# Patient Record
Sex: FEMALE | Race: BLACK OR AFRICAN AMERICAN | Employment: FULL TIME | URBAN - METROPOLITAN AREA
[De-identification: names, ages, dates, MRNs, and addresses within clinical notes are randomized per-mention and may not be internally consistent; named-entity substitution may affect disease eponyms.]

---

## 2022-03-08 ENCOUNTER — HOSPITAL ENCOUNTER (EMERGENCY)
Age: 32
Discharge: HOME | End: 2022-03-08
Payer: COMMERCIAL

## 2022-03-08 ENCOUNTER — APPOINTMENT (OUTPATIENT)
Dept: GENERAL RADIOLOGY | Age: 32
End: 2022-03-08
Payer: COMMERCIAL

## 2022-03-08 VITALS
WEIGHT: 225 LBS | SYSTOLIC BLOOD PRESSURE: 175 MMHG | OXYGEN SATURATION: 97 % | DIASTOLIC BLOOD PRESSURE: 103 MMHG | TEMPERATURE: 98 F | HEIGHT: 67 IN | RESPIRATION RATE: 16 BRPM | BODY MASS INDEX: 35.31 KG/M2 | HEART RATE: 93 BPM

## 2022-03-08 DIAGNOSIS — R07.9 CHEST PAIN, UNSPECIFIED TYPE: Primary | ICD-10-CM

## 2022-03-08 LAB
ANION GAP SERPL CALCULATED.3IONS-SCNC: 10 MMOL/L (ref 3–16)
BASOPHILS ABSOLUTE: 0 K/UL (ref 0–0.2)
BASOPHILS RELATIVE PERCENT: 0.6 %
BUN BLDV-MCNC: 8 MG/DL (ref 7–20)
CALCIUM SERPL-MCNC: 9.6 MG/DL (ref 8.3–10.6)
CHLORIDE BLD-SCNC: 103 MMOL/L (ref 99–110)
CO2: 24 MMOL/L (ref 21–32)
CREAT SERPL-MCNC: 0.8 MG/DL (ref 0.6–1.1)
EKG ATRIAL RATE: 67 BPM
EKG DIAGNOSIS: NORMAL
EKG P AXIS: 59 DEGREES
EKG P-R INTERVAL: 138 MS
EKG Q-T INTERVAL: 386 MS
EKG QRS DURATION: 80 MS
EKG QTC CALCULATION (BAZETT): 407 MS
EKG R AXIS: 27 DEGREES
EKG T AXIS: 50 DEGREES
EKG VENTRICULAR RATE: 67 BPM
EOSINOPHILS ABSOLUTE: 0 K/UL (ref 0–0.6)
EOSINOPHILS RELATIVE PERCENT: 1 %
GFR AFRICAN AMERICAN: >60
GFR NON-AFRICAN AMERICAN: >60
GLUCOSE BLD-MCNC: 95 MG/DL (ref 70–99)
HCT VFR BLD CALC: 37.1 % (ref 36–48)
HEMOGLOBIN: 11.9 G/DL (ref 12–16)
LYMPHOCYTES ABSOLUTE: 1.4 K/UL (ref 1–5.1)
LYMPHOCYTES RELATIVE PERCENT: 31.1 %
MCH RBC QN AUTO: 26.8 PG (ref 26–34)
MCHC RBC AUTO-ENTMCNC: 32 G/DL (ref 31–36)
MCV RBC AUTO: 83.7 FL (ref 80–100)
MONOCYTES ABSOLUTE: 0.3 K/UL (ref 0–1.3)
MONOCYTES RELATIVE PERCENT: 7.6 %
NEUTROPHILS ABSOLUTE: 2.6 K/UL (ref 1.7–7.7)
NEUTROPHILS RELATIVE PERCENT: 59.7 %
PDW BLD-RTO: 13.3 % (ref 12.4–15.4)
PLATELET # BLD: 312 K/UL (ref 135–450)
PMV BLD AUTO: 7.3 FL (ref 5–10.5)
POTASSIUM REFLEX MAGNESIUM: 3.8 MMOL/L (ref 3.5–5.1)
RBC # BLD: 4.44 M/UL (ref 4–5.2)
SODIUM BLD-SCNC: 137 MMOL/L (ref 136–145)
TROPONIN: <0.01 NG/ML
WBC # BLD: 4.4 K/UL (ref 4–11)

## 2022-03-08 PROCEDURE — 71045 X-RAY EXAM CHEST 1 VIEW: CPT

## 2022-03-08 PROCEDURE — 99283 EMERGENCY DEPT VISIT LOW MDM: CPT

## 2022-03-08 PROCEDURE — 80048 BASIC METABOLIC PNL TOTAL CA: CPT

## 2022-03-08 PROCEDURE — 84484 ASSAY OF TROPONIN QUANT: CPT

## 2022-03-08 PROCEDURE — 93010 ELECTROCARDIOGRAM REPORT: CPT | Performed by: INTERNAL MEDICINE

## 2022-03-08 PROCEDURE — 93005 ELECTROCARDIOGRAM TRACING: CPT | Performed by: EMERGENCY MEDICINE

## 2022-03-08 PROCEDURE — 85025 COMPLETE CBC W/AUTO DIFF WBC: CPT

## 2022-03-08 RX ORDER — IBUPROFEN 800 MG/1
800 TABLET ORAL EVERY 8 HOURS PRN
Qty: 20 TABLET | Refills: 0 | Status: SHIPPED | OUTPATIENT
Start: 2022-03-08 | End: 2022-04-06

## 2022-03-08 ASSESSMENT — ENCOUNTER SYMPTOMS
SHORTNESS OF BREATH: 0
COLOR CHANGE: 0
BACK PAIN: 0
DIARRHEA: 0
NAUSEA: 0
COUGH: 0
CONSTIPATION: 0
CHEST TIGHTNESS: 0
ABDOMINAL PAIN: 0
VOMITING: 0

## 2022-03-08 NOTE — ED PROVIDER NOTES
EKG NOTE:    Rhythm at a rate of 67 beats a minute with no acute ST elevations or depressions or pathologic Q waves. Normal axis. I was not involved with the care of this patient.        Andrew Woodard MD  03/08/22 2455

## 2022-03-08 NOTE — ED PROVIDER NOTES
fever.   Respiratory: Negative for cough, chest tightness and shortness of breath. Cardiovascular: Positive for chest pain and palpitations. Negative for leg swelling. Gastrointestinal: Negative for abdominal pain, constipation, diarrhea, nausea and vomiting. Genitourinary: Negative for decreased urine volume, difficulty urinating, dysuria, flank pain, frequency, hematuria and urgency. Musculoskeletal: Negative for back pain and neck pain. Skin: Negative for color change and rash. Neurological: Negative for dizziness, weakness, light-headedness, numbness and headaches. All other systems reviewed and are negative. Positives and Pertinent negatives as per HPI. Except as noted above in the ROS, all other systems were reviewed/completed and are negative. Physical Exam:  Physical Exam  Vitals and nursing note reviewed. Constitutional:       Appearance: Normal appearance. She is well-developed. She is not toxic-appearing or diaphoretic. HENT:      Head: Normocephalic. Right Ear: External ear normal.      Left Ear: External ear normal.      Nose: Nose normal.   Eyes:      General:         Right eye: No discharge. Left eye: No discharge. Conjunctiva/sclera: Conjunctivae normal.   Cardiovascular:      Rate and Rhythm: Normal rate and regular rhythm. Pulses: Normal pulses. Heart sounds: Normal heart sounds. No murmur heard. No friction rub. No gallop. Pulmonary:      Effort: Pulmonary effort is normal. No respiratory distress. Breath sounds: Normal breath sounds. No wheezing or rales. Musculoskeletal:         General: Normal range of motion. Cervical back: Normal range of motion and neck supple. Skin:     General: Skin is warm and dry. Coloration: Skin is not pale. Neurological:      Mental Status: She is alert and oriented to person, place, and time. Psychiatric:         Behavior: Behavior normal. Behavior is cooperative.          MEDICAL DECISION MAKING    Vitals:    Vitals:    03/08/22 1354   BP: (!) 175/103   Pulse: 93   Resp: 16   Temp: 98 °F (36.7 °C)   SpO2: 97%   Weight: 225 lb (102.1 kg)   Height: 5' 7\" (1.702 m)       LABS:   Results for orders placed or performed during the hospital encounter of 03/08/22   CBC with Auto Differential   Result Value Ref Range    WBC 4.4 4.0 - 11.0 K/uL    RBC 4.44 4.00 - 5.20 M/uL    Hemoglobin 11.9 (L) 12.0 - 16.0 g/dL    Hematocrit 37.1 36.0 - 48.0 %    MCV 83.7 80.0 - 100.0 fL    MCH 26.8 26.0 - 34.0 pg    MCHC 32.0 31.0 - 36.0 g/dL    RDW 13.3 12.4 - 15.4 %    Platelets 031 628 - 758 K/uL    MPV 7.3 5.0 - 10.5 fL    Neutrophils % 59.7 %    Lymphocytes % 31.1 %    Monocytes % 7.6 %    Eosinophils % 1.0 %    Basophils % 0.6 %    Neutrophils Absolute 2.6 1.7 - 7.7 K/uL    Lymphocytes Absolute 1.4 1.0 - 5.1 K/uL    Monocytes Absolute 0.3 0.0 - 1.3 K/uL    Eosinophils Absolute 0.0 0.0 - 0.6 K/uL    Basophils Absolute 0.0 0.0 - 0.2 K/uL   Basic Metabolic Panel w/ Reflex to MG   Result Value Ref Range    Sodium 137 136 - 145 mmol/L    Potassium reflex Magnesium 3.8 3.5 - 5.1 mmol/L    Chloride 103 99 - 110 mmol/L    CO2 24 21 - 32 mmol/L    Anion Gap 10 3 - 16    Glucose 95 70 - 99 mg/dL    BUN 8 7 - 20 mg/dL    CREATININE 0.8 0.6 - 1.1 mg/dL    GFR Non-African American >60 >60    GFR African American >60 >60    Calcium 9.6 8.3 - 10.6 mg/dL   Troponin   Result Value Ref Range    Troponin <0.01 <0.01 ng/mL   EKG 12 Lead   Result Value Ref Range    Ventricular Rate 67 BPM    Atrial Rate 67 BPM    P-R Interval 138 ms    QRS Duration 80 ms    Q-T Interval 386 ms    QTc Calculation (Bazett) 407 ms    P Axis 59 degrees    R Axis 27 degrees    T Axis 50 degrees    Diagnosis Normal sinus rhythmNormal ECG        Remainder of labs reviewed and were negative at this time or not returned at the time of this note.     RADIOLOGY:   Non-plain film images suchas CT, Ultrasound and MRI are read by the radiologist. Derick Rodríguez RICKY Sanz have directly visualized the radiologic plain film image(s) with the below findings:  Interpretation per the Radiologist below, if available at the time of this note:    XR CHEST PORTABLE   Final Result   Clear lungs. MEDICAL DECISION MAKING / ED COURSE:      PROCEDURES:   Procedures    Patient was given:  Medications - No data to display  Patient presents to the emergency department with chest pain right-sided that started 3 hours prior to arrival emergency department. EKG read by attending physician shows no acute MI or abnormalities. CBC, chemistry and troponin are normal.  Chest x-ray without acute imaging abnormalities. Patient had reproducible pain and no pain since being here in the emergency department. 3-hour troponin is not required as its been 3 hours since her last chest pain. She is to follow-up outpatient with primary care physician. Return to the ER for worsening of symptoms. Low heart score. Motrin prescribed for home. The patient presents with chest pain. The patient has been evaluated and the history and physical exam suggest a benign etiology. I see nothing to suggest coronary ischemia, myocardial infarction, pulmonary embolism, thoracic aortic dissection, significant pericarditis, pneumonia, pneumothorax, or acute abdomen. I feel the patient can be safely discharged to home with outpatient follow up. Instructions have been given for the patient to return to the ED for any worsening of the symptoms, including but not limited to increased pain, shortness of breath, abdominal pain or weakness. The patient tolerated their visit well. I have evaluated the patient with physician available for consultation as needed. I have discussed the findings of today's workup with the patient and addressed the patient's questions and concerns. Important warning signs as well as new or worsening symptoms which wouldnecessitate immediate return to the ED were discussed.  The plan is to discharge from the ED at this time, and the patient is in stable condition. The patient acknowledged understanding is agreeable with this plan. CLINICAL IMPRESSION:  1.  Chest pain, unspecified type        DISPOSITION Decision To Discharge 03/08/2022 04:14:49 PM      PATIENT REFERRED TO:  [facility]) Pre-Services  [phone]          DISCHARGE MEDICATIONS:  Discharge Medication List as of 3/8/2022  4:24 PM      START taking these medications    Details   ibuprofen (ADVIL;MOTRIN) 800 MG tablet Take 1 tablet by mouth every 8 hours as needed for Pain, Disp-20 tablet, R-0Print                    (Please note the MDM and HPI sections of this note were completed with LE TOTE recognition program.  Efforts were made to edit the dictations but occasionally words are mis-transcribed.)    Electronically signed, RICKY Sumner,            RICKY Wood  03/08/22 2589

## 2022-04-06 ENCOUNTER — OFFICE VISIT (OUTPATIENT)
Dept: FAMILY MEDICINE CLINIC | Age: 32
End: 2022-04-06
Payer: COMMERCIAL

## 2022-04-06 VITALS
WEIGHT: 226 LBS | DIASTOLIC BLOOD PRESSURE: 86 MMHG | HEART RATE: 81 BPM | OXYGEN SATURATION: 98 % | TEMPERATURE: 97.5 F | HEIGHT: 67 IN | SYSTOLIC BLOOD PRESSURE: 132 MMHG | BODY MASS INDEX: 35.47 KG/M2

## 2022-04-06 DIAGNOSIS — Z13.220 LIPID SCREENING: ICD-10-CM

## 2022-04-06 DIAGNOSIS — Z00.00 ANNUAL PHYSICAL EXAM: Primary | ICD-10-CM

## 2022-04-06 DIAGNOSIS — Z13.1 DIABETES MELLITUS SCREENING: ICD-10-CM

## 2022-04-06 DIAGNOSIS — R42 VERTIGO: ICD-10-CM

## 2022-04-06 PROCEDURE — 36415 COLL VENOUS BLD VENIPUNCTURE: CPT | Performed by: NURSE PRACTITIONER

## 2022-04-06 PROCEDURE — 99385 PREV VISIT NEW AGE 18-39: CPT | Performed by: NURSE PRACTITIONER

## 2022-04-06 RX ORDER — PREDNISONE 10 MG/1
TABLET ORAL
Qty: 20 TABLET | Refills: 0 | Status: SHIPPED | OUTPATIENT
Start: 2022-04-06

## 2022-04-06 SDOH — ECONOMIC STABILITY: TRANSPORTATION INSECURITY
IN THE PAST 12 MONTHS, HAS LACK OF TRANSPORTATION KEPT YOU FROM MEETINGS, WORK, OR FROM GETTING THINGS NEEDED FOR DAILY LIVING?: NO

## 2022-04-06 SDOH — ECONOMIC STABILITY: FOOD INSECURITY: WITHIN THE PAST 12 MONTHS, YOU WORRIED THAT YOUR FOOD WOULD RUN OUT BEFORE YOU GOT MONEY TO BUY MORE.: NEVER TRUE

## 2022-04-06 SDOH — ECONOMIC STABILITY: TRANSPORTATION INSECURITY
IN THE PAST 12 MONTHS, HAS THE LACK OF TRANSPORTATION KEPT YOU FROM MEDICAL APPOINTMENTS OR FROM GETTING MEDICATIONS?: NO

## 2022-04-06 SDOH — ECONOMIC STABILITY: FOOD INSECURITY: WITHIN THE PAST 12 MONTHS, THE FOOD YOU BOUGHT JUST DIDN'T LAST AND YOU DIDN'T HAVE MONEY TO GET MORE.: NEVER TRUE

## 2022-04-06 ASSESSMENT — PATIENT HEALTH QUESTIONNAIRE - PHQ9
SUM OF ALL RESPONSES TO PHQ QUESTIONS 1-9: 0
SUM OF ALL RESPONSES TO PHQ9 QUESTIONS 1 & 2: 0
1. LITTLE INTEREST OR PLEASURE IN DOING THINGS: 0
SUM OF ALL RESPONSES TO PHQ QUESTIONS 1-9: 0
2. FEELING DOWN, DEPRESSED OR HOPELESS: 0

## 2022-04-06 ASSESSMENT — SOCIAL DETERMINANTS OF HEALTH (SDOH): HOW HARD IS IT FOR YOU TO PAY FOR THE VERY BASICS LIKE FOOD, HOUSING, MEDICAL CARE, AND HEATING?: NOT HARD AT ALL

## 2022-04-06 NOTE — PATIENT INSTRUCTIONS
Patient Education        Vertigo: Care Instructions  Your Care Instructions     Vertigo is the feeling that you or your surroundings are moving when there is no actual movement. It is often described as a feeling of spinning, whirling, falling, or tilting. Vertigo may make you vomit or feel nauseated. You may havetrouble standing or walking and may lose your balance. Vertigo is often related to an inner ear problem, but it can have other moreserious causes. If vertigo continues, you may need more tests to find its cause. Follow-up care is a key part of your treatment and safety. Be sure to make and go to all appointments, and call your doctor if you are having problems. It's also a good idea to know your test results and keep alist of the medicines you take. How can you care for yourself at home?  Do not lie flat on your back. Prop yourself up slightly. This may reduce the spinning feeling. Keep your eyes open.  Move slowly so that you do not fall.  If your doctor recommends medicine, take it exactly as directed.  Do not drive while you are having vertigo. Certain exercises, called Amanda-Daroff exercises, can help decrease vertigo. To do Amanda-Daroff exercises:   Sit on the edge of a bed or sofa and quickly lie down on the side that causes the worst vertigo. Lie on your side with your ear down.  Stay in this position for at least 30 seconds or until the vertigo goes away.  Sit up. If this causes vertigo, wait for it to stop.  Repeat the procedure on the other side.  Repeat this 10 times. Do these exercises 2 times a day until the vertigo is gone. When should you call for help? Call 911 anytime you think you may need emergency care. For example, call if:     You passed out (lost consciousness).      You have symptoms of a stroke. These may include:  ? Sudden numbness, tingling, weakness, or loss of movement in your face, arm, or leg, especially on only one side of your body.   ? Sudden vision changes. ? Sudden trouble speaking. ? Sudden confusion or trouble understanding simple statements. ? Sudden problems with walking or balance. ? A sudden, severe headache that is different from past headaches. Call your doctor now or seek immediate medical care if:     Vertigo occurs with a fever, a headache, or ringing in your ears.      You have new or increased nausea and vomiting. Watch closely for changes in your health, and be sure to contact your doctor if:     Vertigo gets worse or happens more often.      Vertigo has not gotten better after 2 weeks. Where can you learn more? Go to [email or web address] and sign in to your Sign[employer]d account. Enter P876 in the [place] box to learn more about \"Vertigo: Care Instructions. \"     If you do not have an account, please click on the \"Sign Up Now\" link. Current as of: September 8, 2021               Content Version: 13.2  © 2006-2022 [employer]. Care instructions adapted under license by [facility] ([facility]). If you have questions about a medical condition or this instruction, always ask your healthcare professional. [place] [address] any warranty or liability for your use of this information. Patient Education        Vertigo: Exercises  Introduction  Here are some examples of exercises for you to try. The exercises may be suggested for a condition or for rehabilitation. Start each exercise slowly. Ease off the exercises if you start to have pain. You will be told when to start these exercises and which ones will work bestfor you. How to do the exercises  Exercise 1    1. Stand with a chair in front of you and a wall behind you. If you begin to fall, you may use them for support. 2. Stand with your feet together and your arms at your sides. 3. Move your head up and down 10 times. Exercise 2    1. Move your head side to side 10 times. Exercise 3    1.  Move your head diagonally up and down 10 times. Exercise 4    1. Move your head diagonally up and down 10 times on the other side. Follow-up care is a key part of your treatment and safety. Be sure to make and go to all appointments, and call your doctor if you are having problems. It's also a good idea to know your test results and keep alist of the medicines you take. Where can you learn more? Go to [email or web address] and sign in to your Tutee account. Enter F349 in the [employer]inate box to learn more about \"Vertigo: Exercises. \"     If you do not have an account, please click on the \"Sign Up Now\" link. Current as of: September 8, 2021               Content Version: 13.2  © 2006-2022 Healthwise, [employer]. Care instructions adapted under license by [facility] ([facility]). If you have questions about a medical condition or this instruction, always ask your healthcare professional. [place] [address] any warranty or liability for your use of this information. Patient Education        Well Visit, Ages 25 to 48: Care Instructions  Overview     Well visits can help you stay healthy. Your doctor has checked your overall health and may have suggested ways to take good care of yourself. Your doctor also may have recommended tests. At home, you can help prevent illness withhealthy eating, regular exercise, and other steps. Follow-up care is a key part of your treatment and safety. Be sure to make and go to all appointments, and call your doctor if you are having problems. It's also a good idea to know your test results and keep alist of the medicines you take. How can you care for yourself at home?  Get screening tests that you and your doctor decide on. Screening helps find diseases before any symptoms appear.  Eat healthy foods. Choose fruits, vegetables, whole grains, protein, and low-fat dairy foods. Limit fat, especially saturated fat. Reduce salt in your diet.  Limit alcohol. much sun. When you're outdoors from 10 a.m. to 4 p.m., stay in the shade or cover up with clothing and a hat with a wide brim. Wear sunglasses that block UV rays. Even when it's cloudy, put broad-spectrum sunscreen (SPF 30 or higher) on any exposed skin.  See a dentist one or two times a year for checkups and to have your teeth cleaned.  Wear a seat belt in the car. When should you call for help? Watch closely for changes in your health, and be sure to contact your doctor if you have any problems or symptoms that concern you. Where can you learn more? Go to [email or web address] and sign in to your [employer] account. Enter P072 in the SecurActive box to learn more about \"Well Visit, Ages 25 to 48: Care Instructions. \"     If you do not have an account, please click on the \"Sign Up Now\" link. Current as of: October 6, 2021               Content Version: 13.2  © 2006-2022 [employer]. Care instructions adapted under license by [facility] ([facility]). If you have questions about a medical condition or this instruction, always ask your healthcare professional. [place] [address] any warranty or liability for your use of this information. Patient Education        Breast Self-Exam: Care Instructions  Your Care Instructions     A breast self-exam is when you check your breasts for lumps or changes. This regular exam helps you learn how your breasts normally look and feel. Mostbreast problems or changes are not because of cancer. Breast self-exam is not a substitute for a mammogram. Having regular breast exams by your doctor and regular mammograms improve your chances of finding anyproblems with your breasts. Some women set a time each month to do a step-by-step breast self-exam. Other women like a less formal system. They might look at their breasts as they brushtheir teeth, or feel their breasts once in a while in the shower.   If you notice a change in your breast, tell your doctor. Follow-up care is a key part of your treatment and safety. Be sure to make and go to all appointments, and call your doctor if you are having problems. It's also a good idea to know your test results and keep alist of the medicines you take. How do you do a breast self-exam?   The best time to examine your breasts is usually one week after your menstrual period begins. Your breasts should not be tender then. If you do not have periods, you might do your exam on a day of the month that is easy to remember.  To examine your breasts:  ? Remove all your clothes above the waist and lie down. When you are lying down, your breast tissue spreads evenly over your chest wall, which makes it easier to feel all your breast tissue. ? Use the pads--not the fingertips--of the 3 middle fingers of your left hand to check your right breast. Move your fingers slowly in small coin-sized circles that overlap. ? Use three levels of pressure to feel of all your breast tissue. Use light pressure to feel the tissue close to the skin surface. Use medium pressure to feel a little deeper. Use firm pressure to feel your tissue close to your breastbone and ribs. Use each pressure level to feel your breast tissue before moving on to the next spot. ? Check your entire breast, moving up and down as if following a strip from the collarbone to the bra line, and from the armpit to the ribs. Repeat until you have covered the entire breast.  ? Repeat this procedure for your left breast, using the pads of the 3 middle fingers of your right hand.  To examine your breasts while in the shower:  ? Place one arm over your head and lightly soap your breast on that side. ? Using the pads of your fingers, gently move your hand over your breast (in the strip pattern described above), feeling carefully for any lumps or changes.   ? Repeat for the other breast.   Have your doctor inspect anything you notice to see if you need further testing. Where can you learn more? Go to [email or web address] and sign in to your [employer] account. Enter P148 in the [facility] box to learn more about \"Breast Self-Exam: Care Instructions. \"     If you do not have an account, please click on the \"Sign Up Now\" link. Current as of: September 8, 2021               Content Version: 13.2  © 2006-2022 Healthwise, Incorporated. Care instructions adapted under license by [facility] ([facility]). If you have questions about a medical condition or this instruction, always ask your healthcare professional. [address] any warranty or liability for your use of this information.

## 2022-04-06 NOTE — PROGRESS NOTES
atraumatic. Right Ear: Tympanic membrane, external ear and ear canal normal.   Left Ear: Tympanic membrane, external ear and ear canal normal.   Mouth/Throat: Oropharynx is clear and moist, and mucous membranes are normal.  There is no cervical adenopathy. Eyes: Conjunctivae and extraocular motions are normal. Pupils are equal, round, and reactive to light. Neck: Supple. No JVD present. Carotid bruit is not present. No mass and no thyromegaly present. Cardiovascular: Normal rate, regular rhythm, normal heart sounds and intact distal pulses. Exam reveals no gallop and no friction rub. No murmur heard. Pulmonary/Chest: Effort normal and breath sounds normal. No respiratory distress. She has no wheezes, rhonchi or rales. Abdominal: Soft, non-tender. Bowel sounds and aorta are normal. She exhibits no organomegaly, mass or bruit. Musculoskeletal: Normal range of motion, no synovitis. She exhibits no edema. Neurological: She is alert and oriented to person, place, and time. She has normal reflexes. No cranial nerve deficit. Coordination normal.   Skin: Skin is warm and dry. There is no rash or erythema. No suspicious lesions noted. Psychiatric: She has a normal mood and affect.  Her speech is normal and behavior is normal. Judgment, cognition and memory are normal.     Preventive Care:  Health Maintenance   Topic Date Due    Hepatitis C screen  Never done    Varicella vaccine (1 of 2 - 2-dose childhood series) Never done    Pneumococcal 0-64 years Vaccine (1 of 2 - PPSV23) Never done    Depression Screen  Never done    HIV screen  Never done    Hepatitis B vaccine (2 of 3 - 3-dose primary series) 12/20/2004    Cervical cancer screen  Never done    COVID-19 Vaccine (2 - Booster for Golf121 series) 02/22/2022    Flu vaccine (Season Ended) 09/01/2022    DTaP/Tdap/Td vaccine (7 - Td or Tdap) 11/06/2023    Hib vaccine  Completed    Hepatitis A vaccine  Aged Out    Meningococcal (ACWY) vaccine Aged Out      Hx abnormal PAP: no  Sexual activity: has sex with females   Self-breast exams: no  Last eye exam: n/a,-  Exercise: no regular exercise  Seatbelt use:       Preventive plan of care for Melony Fisher        4/6/2022           Preventive Measures Statuswill consider COVID vaccine       Recommendations for screening   Colon Cancer Screen   Last colonoscopy: n/a This test is not clinically indicated   Breast Cancer Screen  Last mammogram: n/a  This test is not clinically indicated   Cervical Cancer Screen   Last PAP smear: n/a Test is due she will schedule an appointment   Osteoporosis Screen   Last DXA scan: n/a This test is not clinically indicated   Diabetes Screen  Glucose (mg/dL)   Date Value   03/08/2022 95    Test recommended and ordered   Cholesterol Screen  No results found for: CHOL, TRIG, HDL, LDLCALC, LDLDIRECT Test recommended and ordered   Aspirin for Cardiovascular Prevention   No Not indicated   Weight: Body mass index is 35.4 kg/m².   5' 7\" (1.702 m)226 lb (102.5 kg)    Your BMI is 25 or greater, which indicates that you are overweight   Living Will: No   Patient declined    Recommended Immunizations    Immunization History   Administered Date(s) Administered    COVID-19, J&J, PF, 0.5 mL 12/28/2021    DTP 1988, 03/28/1989, 05/02/1989, 05/07/1990, 08/30/1993    Hepatitis B Ped/Adol (Engerix-B, Recombivax HB) 11/22/2004    Hib vaccine 05/07/1990    MMR 05/07/1990, 09/20/1996    Polio OPV 1988, 03/28/1989, 05/02/1989, 05/07/1990, 08/30/1993    Tdap (Boostrix, Adacel) 11/06/2013        Will consider COVID vaccine     Other Recommendations ·   See an eye specialist every 1 years  · See a dentist every 6 months  · Try to get at least 30 minutes of exercise 3-4 days per week  · When exposed to the sun, use a sunscreen that protects against both UVA and UVB radiation with an SPF of 30 or greater- reapply every 2 to 3 hours or after sweating, drying off with a towel, or swimming  · You need 0823-7256 mg of calcium and 1982-1814 IU of vitamin D per day- it is possible to meet your calcium requirement with diet alone, but a vitamin D supplement is usually necessary  · Have your blood pressure checked at least once every year                 Laron Rizzo was seen today for new patient and follow-up from hospital.    Diagnoses and all orders for this visit:    Annual physical exam  Wellness recommendations reviewed with pt  · See an eye specialist every 1 years  · See a dentist every 6 months- [facility]  · Try to get at least 30 minutes of exercise 3-4 days per week  · When exposed to the sun, use a sunscreen that protects against both UVA and UVB radiation with an SPF of 30 or greater- reapply every 2 to 3 hours or after sweating, drying off with a towel, or swimming  · You need 0490-0350 mg of calcium and 0890-1917 IU of vitamin D per day- it is possible to meet your calcium requirement with diet alone, but a vitamin D supplement is usually necessary  · Have your blood pressure checked at least once every year  · Encouraged to get COVID booster- Pfizer- Moderna    Vertigo  -     predniSONE (DELTASONE) 10 MG tablet; 4 tabs x 2 d 3 tabs x 2 d 2 tabs x 2 d 1  tab x 2 d in am with food  t instructed to avoid NSAIDs while on this medication  Instructions for Respiratory Infections (SAVE THIS SHEET)   For the first 7-14 days of symptoms follow instructions below, even before being seen in the office or even during treatment with antibiotics, until symptom free. 1. Water: Drink 1 ounce of water for every 2 pounds of body weight for adults need 64 Ounces of water per day. This will loosen mucus in the head and chest & improve the weak feeling of dehydration, allow the body to get germ fighting resources to the infection. Half can be juice or sugar free Crystal Light. Don't count drinks with caffeine or carbonation. Infants can have Pedialyte liquid or freezer pops.  Avoid salt if you have high Blood Pressure, swelling in the feet or ankles or have heart problems. Gargle: Gargle in the back of the throat with the head tilted back and to the sides with a strong mouthwash ( Listerine or Scope) after meals and at bedtime at least 4 -5 times a day. This helps kill bacteria and viruses in the back of the throat and will shorten the duration and decrease the severity of your symptoms: sore throat, cough, ear popping,/ear pain, and possibly dizziness. Exercises reviewed with pt          Lipid screening  -     Lipid, Fasting; Future  -     Lipid, Fasting    Diabetes mellitus screening  -     Comprehensive Metabolic Panel;  Future  -     Comprehensive Metabolic Panel

## 2022-04-07 LAB
A/G RATIO: 2 (ref 1.1–2.2)
ALBUMIN SERPL-MCNC: 5.1 G/DL (ref 3.4–5)
ALP BLD-CCNC: 77 U/L (ref 40–129)
ALT SERPL-CCNC: 31 U/L (ref 10–40)
ANION GAP SERPL CALCULATED.3IONS-SCNC: 20 MMOL/L (ref 3–16)
AST SERPL-CCNC: 24 U/L (ref 15–37)
BILIRUB SERPL-MCNC: <0.2 MG/DL (ref 0–1)
BUN BLDV-MCNC: 10 MG/DL (ref 7–20)
CALCIUM SERPL-MCNC: 9.8 MG/DL (ref 8.3–10.6)
CHLORIDE BLD-SCNC: 102 MMOL/L (ref 99–110)
CHOLESTEROL, FASTING: 198 MG/DL (ref 0–199)
CO2: 18 MMOL/L (ref 21–32)
CREAT SERPL-MCNC: 0.8 MG/DL (ref 0.6–1.1)
GFR AFRICAN AMERICAN: >60
GFR NON-AFRICAN AMERICAN: >60
GLUCOSE BLD-MCNC: 72 MG/DL (ref 70–99)
HDLC SERPL-MCNC: 60 MG/DL (ref 40–60)
LDL CHOLESTEROL CALCULATED: 124 MG/DL
POTASSIUM SERPL-SCNC: 4.3 MMOL/L (ref 3.5–5.1)
SODIUM BLD-SCNC: 140 MMOL/L (ref 136–145)
TOTAL PROTEIN: 7.6 G/DL (ref 6.4–8.2)
TRIGLYCERIDE, FASTING: 69 MG/DL (ref 0–150)
VLDLC SERPL CALC-MCNC: 14 MG/DL

## 2022-04-21 ENCOUNTER — TELEPHONE (OUTPATIENT)
Dept: FAMILY MEDICINE CLINIC | Age: 32
End: 2022-04-21

## 2022-04-21 NOTE — TELEPHONE ENCOUNTER
----- Message from Deandre King sent at 4/21/2022 12:06 PM EDT -----  Subject: Message to Provider    QUESTIONS  Information for Provider? [facility] received a call from Pt? Pt needs to r/s her   pap smear and pelvic exam appt with PCP Viki today, 4/21/22, at 2pm. Pt   would like to schedule on 4/25/22 or 4/27/22 anytime if possible. Pt has   nothing further to add at this time. ---------------------------------------------------------------------------  --------------  Karen JACKSON  What is the best way for the office to contact you? OK to leave message on   voicemail  Preferred Call Back Phone Number? [phone]  ---------------------------------------------------------------------------  --------------  SCRIPT ANSWERS  Relationship to Patient? Self  Have your symptoms changed? No  (Is the patient requesting to see the provider for a procedure?)?  Yes

## 2022-12-09 ENCOUNTER — HOSPITAL ENCOUNTER (EMERGENCY)
Age: 32
Discharge: HOME | End: 2022-12-09
Payer: COMMERCIAL

## 2022-12-09 VITALS
WEIGHT: 200 LBS | HEART RATE: 87 BPM | BODY MASS INDEX: 31.32 KG/M2 | TEMPERATURE: 98 F | RESPIRATION RATE: 18 BRPM | SYSTOLIC BLOOD PRESSURE: 171 MMHG | OXYGEN SATURATION: 99 % | DIASTOLIC BLOOD PRESSURE: 106 MMHG

## 2022-12-09 DIAGNOSIS — G89.29 ACUTE EXACERBATION OF CHRONIC LOW BACK PAIN: Primary | ICD-10-CM

## 2022-12-09 DIAGNOSIS — M54.50 ACUTE EXACERBATION OF CHRONIC LOW BACK PAIN: Primary | ICD-10-CM

## 2022-12-09 PROCEDURE — 99283 EMERGENCY DEPT VISIT LOW MDM: CPT

## 2022-12-09 RX ORDER — NAPROXEN 500 MG/1
500 TABLET ORAL 2 TIMES DAILY WITH MEALS
Qty: 60 TABLET | Refills: 0 | Status: SHIPPED | OUTPATIENT
Start: 2022-12-09

## 2022-12-09 RX ORDER — CYCLOBENZAPRINE HCL 10 MG
10 TABLET ORAL 3 TIMES DAILY PRN
Qty: 20 TABLET | Refills: 0 | Status: SHIPPED | OUTPATIENT
Start: 2022-12-09 | End: 2022-12-19

## 2022-12-09 RX ORDER — METHYLPREDNISOLONE 4 MG/1
TABLET ORAL
Qty: 1 KIT | Refills: 0 | Status: SHIPPED | OUTPATIENT
Start: 2022-12-09

## 2022-12-09 ASSESSMENT — ENCOUNTER SYMPTOMS
BACK PAIN: 1
CHEST TIGHTNESS: 0
NAUSEA: 0
DIARRHEA: 0
VOMITING: 0
SHORTNESS OF BREATH: 0
ABDOMINAL PAIN: 0

## 2022-12-09 ASSESSMENT — PAIN SCALES - GENERAL: PAINLEVEL_OUTOF10: 7

## 2022-12-09 ASSESSMENT — PAIN - FUNCTIONAL ASSESSMENT: PAIN_FUNCTIONAL_ASSESSMENT: 0-10

## 2022-12-10 NOTE — ED PROVIDER NOTES
[facility]        Pt Name: Freddie Thomas  MRN: [id number]  [place] 1990  Date of evaluation: 12/9/2022  Provider: All Fisher PA-C  PCP: QIANA Marshall CNP  Note Started: 7:51 PM EST 12/9/22          CYNDI. I have evaluated this patient. My supervising physician was available for consultation. CHIEF COMPLAINT       Chief Complaint   Patient presents with    Back Pain     Constant lower back pain for approx one week. HISTORY OF PRESENT ILLNESS   (Location, Timing/Onset, Context/Setting, Quality, Duration, Modifying Factors, Severity, Associated Signs and Symptoms)  Note limiting factors. Chief Complaint: Low back pain    Freddie Thomas is a 27 y.o. female who presents to the emergency department today complaining of acute exacerbation of her chronic low back pain. She denies any recent injury. She denies changes in bowel or bladder habits, incontinence, saddle paresthesia, numbness, tingling or weakness in her extremities. She has no GI, urinary or  complaints    Nursing Notes were all reviewed and agreed with or any disagreements were addressed in the HPI. REVIEW OF SYSTEMS    (2-9 systems for level 4, 10 or more for level 5)     Review of Systems   Constitutional:  Negative for chills and fever. Respiratory:  Negative for chest tightness and shortness of breath. Cardiovascular:  Negative for chest pain. Gastrointestinal:  Negative for abdominal pain, diarrhea, nausea and vomiting. Genitourinary:  Negative for dysuria. Musculoskeletal:  Positive for back pain. All other systems reviewed and are negative. Positives and Pertinent negatives as per HPI. Except as noted above in the ROS, all other systems were reviewed and negative.        PAST MEDICAL HISTORY     Past Medical History:   Diagnosis Date    Conductive hearing loss of left ear          SURGICAL HISTORY   History reviewed. No pertinent surgical history. [address]       Discharge Medication List as of 12/9/2022  5:38 PM        CONTINUE these medications which have NOT CHANGED    Details   predniSONE (DELTASONE) 10 MG tablet 4 tabs x 2 d 3 tabs x 2 d 2 tabs x 2 d 1  tab x 2 d in am with food  t instructed to avoid NSAIDs while on this medication, Disp-20 tablet, R-0Pt will  when neededNormal               ALLERGIES     Patient has no known allergies. FAMILYHISTORY       Family History   Problem Relation Age of Onset    Diabetes Mother     High Blood Pressure Mother     Other Mother         schizophrenia    Other Brother         schizophrenia          SOCIAL HISTORY       Social History     Tobacco Use    Smoking status: Former     Types: Cigars     Quit date: 4/6/2019     Years since quitting: 3.6    Smokeless tobacco: Never   Vaping Use    Vaping Use: Never used   Substance Use Topics    Alcohol use: Yes     Alcohol/week: 6.0 - 7.0 standard drinks     Types: 6 - 7 Cans of beer per week     Comment: 6-7 trulys nightly    Drug use: No       SCREENINGS             PHYSICAL EXAM    (up to 7 for level 4, 8 or more for level 5)     ED Triage Vitals   BP Temp Temp src Heart Rate Resp SpO2 Height Weight   12/09/22 1607 12/09/22 1607 -- 12/09/22 1605 12/09/22 1605 12/09/22 1605 -- 12/09/22 1605   (!) 171/106 98 °F (36.7 °C)  87 18 99 %  200 lb (90.7 kg)       Physical Exam  Vitals and nursing note reviewed. Constitutional:       Appearance: She is well-developed. She is not diaphoretic. HENT:      Head: Normocephalic and atraumatic. Eyes:      General:         Right eye: No discharge. Left eye: No discharge. Cardiovascular:      Rate and Rhythm: Normal rate and regular rhythm. Pulses: Normal pulses. Heart sounds: Normal heart sounds. Pulmonary:      Effort: Pulmonary effort is normal. No respiratory distress. Breath sounds: Normal breath sounds.    Abdominal:      General: Abdomen is flat. Bowel sounds are normal.      Palpations: Abdomen is soft. Musculoskeletal:         General: Normal range of motion. Cervical back: Normal range of motion and neck supple. Lumbar back: Spasms and tenderness present. Comments: Patient denies midline tenderness on palpation of the cervical, thoracic or lumbar spine. she does have tenderness on palpation of the lumbar paraspinous muscles bilaterally. she has full range of motion of all extremities with 5/5 motor strength symmetrically. Patient denies sensory deficits and extremities are neurovascularly intact. Extremities with good color and capillary refill <2 seconds. There is no extremity edema or signs of vascular compromise. Skin:     General: Skin is warm and dry. Coloration: Skin is not pale. Neurological:      Mental Status: She is alert and oriented to person, place, and time. Psychiatric:         Behavior: Behavior normal.       DIAGNOSTIC RESULTS   LABS:    Labs Reviewed - No data to display    When ordered only abnormal lab results are displayed. All other labs were within normal range or not returned as of this dictation. EKG: When ordered, EKG's are interpreted by the Emergency Department Physician in the absence of a cardiologist.  Please see their note for interpretation of EKG. RADIOLOGY:   Non-plain film images such as CT, Ultrasound and MRI are read by the radiologist. Plain radiographic images are visualized and preliminarily interpreted by the ED Provider with the below findings:        Interpretation per the Radiologist below, if available at the time of this note:    No orders to display     No results found.         PROCEDURES   Unless otherwise noted below, none     Procedures    CRITICAL CARE TIME       CONSULTS:  None      EMERGENCY DEPARTMENT COURSE and DIFFERENTIAL DIAGNOSIS/MDM:   Vitals:    Vitals:    12/09/22 1605 12/09/22 1607   BP:  (!) 171/106   Pulse: 87    Resp: 18    Temp:  98 °F (36.7 °C)   SpO2: 99%    Weight: 200 lb (90.7 kg)        Patient was given the following medications:  Medications - No data to display      Is this patient to be included in the SEP-1 Core Measure due to severe sepsis or septic shock? No   Exclusion criteria - the patient is NOT to be included for SEP-1 Core Measure due to: Infection is not suspected    Patient presented to the emergency department today complaining of acute exacerbation of her chronic low back pain. This began atraumatically. There are no signs of cauda equina. Her extremities are neurovascularly intact. She will be discharged with Naprosyn, Medrol Dosepak and Flexeril. I completed a structured, evidence-based clinical evaluation to screen for acute non-traumatic spinal emergencies. The patient has a normal detailed neurologic exam and a low red flag score. Patient denies any history of new numbness, weakness, incontinence of bowel or bladder, constipation, saddle anesthesia or paresthesias. I estimate there is LOW risk for ABDOMINAL AORTIC ANEURYSM, CAUDA EQUINA SYNDROME, EPIDURAL MASS LESION, OR CORD COMPRESSION. I have discussed with the patient my clinical impression and the result of an evidence-based clinical evaluation to screen for spinal epidural abscess and other spinal emergencies, as well as the risk of further testing and hospitalization. The evidence shows that the risk for an acute spinal emergency is less than 1%. Although the risk of an acute spinal emergency has not been completely eliminated, the risks of further testing likely exceed any potential benefit, and the patient agrees with not pursuing further emergent evaluation for causes of back pain at this time. FINAL IMPRESSION      1.  Acute exacerbation of chronic low back pain          DISPOSITION/PLAN   DISPOSITION Decision To Discharge 12/09/2022 05:36:11 PM      PATIENT REFERRED TO:  QIANA Lopez - CNP  [address] [facility] [address]  [phone]    Schedule an appointment as soon as possible for a visit       DISCHARGE MEDICATIONS:  Discharge Medication List as of 12/9/2022  5:38 PM        START taking these medications    Details   naproxen (NAPROSYN) 500 MG tablet Take 1 tablet by mouth 2 times daily (with meals), Disp-60 tablet, R-0Normal      methylPREDNISolone (MEDROL, LEA,) 4 MG tablet By mouth., Disp-1 kit, R-0Normal      cyclobenzaprine (FLEXERIL) 10 MG tablet Take 1 tablet by mouth 3 times daily as needed for Muscle spasms, Disp-20 tablet, R-0, DAWNormal             DISCONTINUED MEDICATIONS:  Discharge Medication List as of 12/9/2022  5:38 PM                 (Please note that portions of this note were completed with a voice recognition program.  Efforts were made to edit the dictations but occasionally words are mis-transcribed.)    Deirdre Gray PA-C (electronically signed)           Deidrre Gray PA-C  12/09/22 1955

## 2022-12-14 ENCOUNTER — OFFICE VISIT (OUTPATIENT)
Dept: FAMILY MEDICINE CLINIC | Age: 32
End: 2022-12-14

## 2022-12-14 VITALS
SYSTOLIC BLOOD PRESSURE: 140 MMHG | BODY MASS INDEX: 33.12 KG/M2 | WEIGHT: 211 LBS | DIASTOLIC BLOOD PRESSURE: 100 MMHG | OXYGEN SATURATION: 98 % | HEIGHT: 67 IN | HEART RATE: 93 BPM

## 2022-12-14 DIAGNOSIS — I10 HYPERTENSION, UNSPECIFIED TYPE: ICD-10-CM

## 2022-12-14 DIAGNOSIS — Z01.419 WELL WOMAN EXAM WITH ROUTINE GYNECOLOGICAL EXAM: Primary | ICD-10-CM

## 2022-12-14 RX ORDER — CHLORTHALIDONE 25 MG/1
25 TABLET ORAL DAILY
Qty: 30 TABLET | Refills: 0 | Status: SHIPPED | OUTPATIENT
Start: 2022-12-14

## 2022-12-14 NOTE — PROGRESS NOTES
Annual GYN Visit      Leonel Gilbert  YOB: 1990    Date of Service:  12/14/2022    Chief Complaint:   Leonel Gilbert is a 27 y.o. female who presents for routine annual gynecologic visit. Gary Brenner has had two elevated b/p readings one at the dentist office she cant rememebr how high  12/9 er visit 171/106  HPI:  Patient is premenopausal- Patient's last menstrual period was 11/24/2022. .  She denies any concerns  No Known Allergies  Outpatient Medications Marked as Taking for the 12/14/22 encounter (Office Visit) with QIANA Kenney - CNP   Medication Sig Dispense Refill    naproxen (NAPROSYN) 500 MG tablet Take 1 tablet by mouth 2 times daily (with meals) 60 tablet 0    methylPREDNISolone (MEDROL, LEA,) 4 MG tablet By mouth. 1 kit 0    cyclobenzaprine (FLEXERIL) 10 MG tablet Take 1 tablet by mouth 3 times daily as needed for Muscle spasms 20 tablet 0       Preventive Care and Risk Factor Assessment  Health Maintenance   Topic Date Due    Cervical cancer screen  Never done    Varicella vaccine (1 of 2 - 2-dose childhood series) 01/04/2023 (Originally 9/28/1989)    Flu vaccine (1) 12/14/2023 (Originally 8/1/2022)    COVID-19 Vaccine (2 - Booster for Shannon series) 12/01/2025 (Originally 2/22/2022)    Depression Screen  04/06/2023    DTaP/Tdap/Td vaccine (7 - Td or Tdap) 11/06/2023    Hib vaccine  Completed    Hepatitis A vaccine  Aged Out    Meningococcal (ACWY) vaccine  Aged Out    Pneumococcal 0-64 years Vaccine  Aged Out    Hepatitis C screen  Discontinued    HIV screen  Discontinued      Hx abnormal PAP: no  Sexual activity: single partner- has sex with females, contraception - none.   Hx of STD: no  Hx of abnormal mammogram: n/a  Self-breast exams: no  FH of breast cancer: yes - maternal aunts x 2  FH of GYN cancer: no   Previous DEXA scan: n/a  Exercise: no regular exercise  Social History     Tobacco Use   Smoking Status Former    Types: Cigars    Quit date: 4/6/2019    Years since quitting: 3.6   Smokeless Tobacco Never      Social History     Substance and Sexual Activity   Alcohol Use Yes    Alcohol/week: 6.0 - 7.0 standard drinks    Types: 6 - 7 Cans of beer per week    Comment: 6-7 trulys nightly        Immunization History   Administered Date(s) Administered    COVID-19, J&J, (age 18y+), IM, 0.5 mL 12/28/2021    DTP 1988, 03/28/1989, 05/02/1989, 05/07/1990, 08/30/1993    Hepatitis B Ped/Adol (Engerix-B, Recombivax HB) 11/22/2004    Hib vaccine 05/07/1990    MMR 05/07/1990, 09/20/1996    Polio OPV 1988, 03/28/1989, 05/02/1989, 05/07/1990, 08/30/1993    Tdap (Boostrix, Adacel) 11/06/2013       Review of Systems:  A comprehensive review of systems was negative except for what was noted in the HPI. Wt Readings from Last 3 Encounters:   12/14/22 211 lb (95.7 kg)   12/09/22 200 lb (90.7 kg)   04/06/22 226 lb (102.5 kg)     BP Readings from Last 3 Encounters:   12/14/22 (!) 146/100   12/09/22 (!) 171/106   04/06/22 132/86       Physical Exam:  Vitals:    12/14/22 1508   BP: (!) 146/100   Site: Left Upper Arm   Position: Sitting   Cuff Size: Medium Adult   Pulse: 93   SpO2: 98%   Weight: 211 lb (95.7 kg)   Height: 5' 7\" (1.702 m)      Body mass index is 33.05 kg/m². Constitutional: She is oriented to person, place, and time. She appears well-developed and well-nourished. No distress. Neck: No mass and no thyromegaly present. Cardiovascular: Normal rate, regular rhythm and normal heart sounds. No murmur heard. Pulmonary/Chest: Effort and breath sounds normal.   Abdominal: Soft, non-tender. No distension or masses. Genitourinary: urethral meatus normal, normal appearing vulva with no masses, tenderness or lesions, Vagina:  normal mucosa without prolapse or lesions, Cervix:  FRIABLE, Uterus:  AGAPITO DUE TO BODY HABITUS, and Adnexa:  non palpable DUE TO BODY HABITUS.   Breast exam:  breasts appear normal, no suspicious masses, no skin or nipple changes or axillary nodes.  Neurological: She is alert and oriented to person, place, and time. Skin: Skin is warm and dry. No rash noted. No erythema. Psychiatric: She has a normal mood and affect. Her behavior is normal.     Benny Brambila was seen today for gynecologic exam.    Diagnoses and all orders for this visit:    Well woman exam with routine gynecological exam  -     PAP SMEAR  -     VAGINAL PATHOGENS PROBE *A  -     C.trachomatis N.gonorrhoeae DNA, Urine; Future  SBE demonstrated  - instructed to do one week after her cycle    Hypertension, unspecified type  She is instructed to monitor b/p daily - goal 140 /90 or less  chlorthalidone (HYGROTON) 25 MG tablet;  Take 1 tablet by mouth daily se dw pt  Lifestyle Recommendations for High Blood Pressure:  Reduce sodium intake to less than 1500 mg daily  Include 5-7 servings of fruits and vegetables daily  Reduce weight to ideal if overweight  30 minutes of physical activity daily   Follow up in one month

## 2022-12-15 DIAGNOSIS — N76.0 BV (BACTERIAL VAGINOSIS): Primary | ICD-10-CM

## 2022-12-15 DIAGNOSIS — A59.9 TRICHIMONIASIS: ICD-10-CM

## 2022-12-15 DIAGNOSIS — B37.9 YEAST INFECTION: ICD-10-CM

## 2022-12-15 DIAGNOSIS — B96.89 BV (BACTERIAL VAGINOSIS): Primary | ICD-10-CM

## 2022-12-15 LAB
CANDIDA SPECIES, DNA PROBE: ABNORMAL
GARDNERELLA VAGINALIS, DNA PROBE: ABNORMAL
TRICHOMONAS VAGINALIS DNA: ABNORMAL

## 2022-12-15 RX ORDER — FLUCONAZOLE 150 MG/1
TABLET ORAL
Qty: 2 TABLET | Refills: 0 | Status: SHIPPED | OUTPATIENT
Start: 2022-12-15

## 2022-12-15 RX ORDER — METRONIDAZOLE 500 MG/1
500 TABLET ORAL 2 TIMES DAILY
Qty: 14 TABLET | Refills: 0 | Status: SHIPPED | OUTPATIENT
Start: 2022-12-15 | End: 2022-12-22

## 2022-12-17 LAB
HPV COMMENT: NORMAL
HPV TYPE 16: NOT DETECTED
HPV TYPE 18: NOT DETECTED
HPVOH (OTHER TYPES): NOT DETECTED

## 2023-01-11 DIAGNOSIS — I10 HYPERTENSION, UNSPECIFIED TYPE: ICD-10-CM

## 2023-01-11 RX ORDER — CHLORTHALIDONE 25 MG/1
TABLET ORAL
Qty: 30 TABLET | Refills: 0 | Status: SHIPPED | OUTPATIENT
Start: 2023-01-11

## 2024-02-04 ENCOUNTER — HOSPITAL ENCOUNTER (EMERGENCY)
Age: 34
Discharge: HOME | End: 2024-02-04
Payer: COMMERCIAL

## 2024-02-04 VITALS
RESPIRATION RATE: 16 BRPM | SYSTOLIC BLOOD PRESSURE: 164 MMHG | BODY MASS INDEX: 35.36 KG/M2 | OXYGEN SATURATION: 100 % | HEIGHT: 67 IN | TEMPERATURE: 98.6 F | WEIGHT: 225.31 LBS | DIASTOLIC BLOOD PRESSURE: 101 MMHG | HEART RATE: 81 BPM

## 2024-02-04 DIAGNOSIS — S39.012A STRAIN OF LUMBAR REGION, INITIAL ENCOUNTER: Primary | ICD-10-CM

## 2024-02-04 PROCEDURE — 6370000000 HC RX 637 (ALT 250 FOR IP): Performed by: PHYSICIAN ASSISTANT

## 2024-02-04 PROCEDURE — 99283 EMERGENCY DEPT VISIT LOW MDM: CPT

## 2024-02-04 RX ORDER — LIDOCAINE 4 G/G
1 PATCH TOPICAL ONCE
Status: DISCONTINUED | OUTPATIENT
Start: 2024-02-04 | End: 2024-02-04 | Stop reason: HOSPADM

## 2024-02-04 RX ORDER — METHOCARBAMOL 750 MG/1
750 TABLET, FILM COATED ORAL 4 TIMES DAILY
Qty: 20 TABLET | Refills: 0 | Status: SHIPPED | OUTPATIENT
Start: 2024-02-04 | End: 2024-02-14

## 2024-02-04 ASSESSMENT — PAIN DESCRIPTION - PAIN TYPE: TYPE: ACUTE PAIN

## 2024-02-04 ASSESSMENT — PAIN DESCRIPTION - LOCATION: LOCATION: BACK

## 2024-02-04 ASSESSMENT — PAIN SCALES - GENERAL: PAINLEVEL_OUTOF10: 9

## 2024-02-04 ASSESSMENT — PAIN DESCRIPTION - ONSET: ONSET: GRADUAL

## 2024-02-04 ASSESSMENT — PAIN DESCRIPTION - ORIENTATION: ORIENTATION: LOWER

## 2024-02-04 ASSESSMENT — PAIN DESCRIPTION - FREQUENCY: FREQUENCY: CONTINUOUS

## 2024-02-04 ASSESSMENT — PAIN - FUNCTIONAL ASSESSMENT: PAIN_FUNCTIONAL_ASSESSMENT: 0-10

## 2024-02-04 NOTE — DISCHARGE INSTRUCTIONS
As we discussed avoid strenuous activity until symptoms improve.  Try to do stretching intermittently when you are feeling well.  Take over-the-counter pain medication as directed as needed for discomfort.  Apply Lidoderm patches as needed.  You can buy these over-the-counter.  Take Robaxin as directed as needed for discomfort note that this medication can cause sedation and you should not operate heavy machinery including driving a vehicle while taking this medication.  Return to the emergency department sooner with loss of bowel or bladder control, numbness or tingling, fevers, inability to walk, intolerable pain, other concerning symptoms.

## 2024-02-04 NOTE — ED PROVIDER NOTES
THE [facility]  EMERGENCY DEPARTMENT ENCOUNTER          PHYSICIAN ASSISTANT NOTE       Date of evaluation: 2/4/2024    Chief Complaint     Back Pain (Lower back going on a while gotten worse)      History of Present Illness     Blanche Hensley is a 33 y.o. female with a past medical history of chronic low back pain who comes to the emergency department today complaining of exacerbation of this pain.  She reports that she has been working long hours and works on her feet leaning over in front of her for long periods at [employer].  She does not describe any definitive moment of injury.  She does note difficulty sleeping due to pain at night.  She has been taking over-the-counter pain medication without relief.  She reports that muscle relaxers have helped her in the past.  She denies any pain elsewhere, loss of bowel or bladder control, IV drug use, numbness or tingling including in her groin.  She denies fevers, other bowel or bladder changes.    Review of Systems     Review of Systems   Constitutional:  Negative for fever.   Genitourinary:  Negative for dysuria.   Musculoskeletal:  Positive for back pain. Negative for neck pain.   Skin:  Negative for rash and wound.   Neurological:  Negative for weakness and numbness.   Psychiatric/Behavioral:  Positive for sleep disturbance.        Past Medical, Surgical, Family, and Social History     She has a past medical history of Conductive hearing loss of left ear.  She has no past surgical history on file.  Her family history includes Diabetes in her mother; High Blood Pressure in her mother; Hypertension in her maternal grandmother; Other in her brother and mother.  She reports that she quit smoking about 4 years ago. Her smoking use included cigars. She has never used smokeless tobacco. She reports current alcohol use of about 6.0 - 7.0 standard drinks of alcohol per week. She reports that she does not use drugs.    Medications     Previous Medications

## 2024-02-20 ENCOUNTER — OFFICE VISIT (OUTPATIENT)
Age: 34
End: 2024-02-20

## 2024-02-20 VITALS
DIASTOLIC BLOOD PRESSURE: 85 MMHG | HEART RATE: 75 BPM | BODY MASS INDEX: 36.1 KG/M2 | SYSTOLIC BLOOD PRESSURE: 162 MMHG | OXYGEN SATURATION: 98 % | TEMPERATURE: 98.5 F | WEIGHT: 230 LBS | HEIGHT: 67 IN

## 2024-02-20 DIAGNOSIS — G89.29 CHRONIC BILATERAL LOW BACK PAIN WITHOUT SCIATICA: Primary | ICD-10-CM

## 2024-02-20 DIAGNOSIS — M54.50 CHRONIC BILATERAL LOW BACK PAIN WITHOUT SCIATICA: Primary | ICD-10-CM

## 2024-02-20 DIAGNOSIS — S39.012A STRAIN OF LUMBAR REGION, INITIAL ENCOUNTER: ICD-10-CM

## 2024-02-20 RX ORDER — TIZANIDINE 2 MG/1
2 TABLET ORAL NIGHTLY PRN
Qty: 10 TABLET | Refills: 0 | Status: SHIPPED | OUTPATIENT
Start: 2024-02-20

## 2024-02-20 RX ORDER — LIDOCAINE 4 G/G
1 PATCH TOPICAL DAILY
Qty: 30 PATCH | Refills: 0 | Status: SHIPPED | OUTPATIENT
Start: 2024-02-20 | End: 2024-03-21

## 2024-02-20 ASSESSMENT — ENCOUNTER SYMPTOMS
VOMITING: 0
BOWEL INCONTINENCE: 0
ABDOMINAL PAIN: 0
DIARRHEA: 0
BACK PAIN: 1
COUGH: 0
NAUSEA: 0
SHORTNESS OF BREATH: 0
CONSTIPATION: 0

## 2024-02-20 NOTE — PATIENT INSTRUCTIONS
New Prescriptions    LIDOCAINE 4 % EXTERNAL PATCH    Place 1 patch onto the skin daily    TIZANIDINE (ZANAFLEX) 2 MG TABLET    Take 1 tablet by mouth nightly as needed (muscle spasm)       Follow up with PCP ASAP to get restarted on your blood pressure medications. If you can not get in within the next week please call us.

## 2024-02-20 NOTE — PROGRESS NOTES
Blanche Hensley (:  1990) is a 33 y.o. female, New patient, here for evaluation of the following chief complaint(s):  Back Pain (Back pain that wont get better. Affecting sleeping and working. Comes and goes the pain she states and started in December. Lower back tensing.)    ASSESSMENT/PLAN:    ICD-10-CM    1. Chronic bilateral low back pain without sciatica  M54.50 tiZANidine (ZANAFLEX) 2 MG tablet    G89.29 lidocaine 4 % external patch      2. Strain of lumbar region, initial encounter  S39.012A tiZANidine (ZANAFLEX) 2 MG tablet     lidocaine 4 % external patch        Ddx includes: Chronic back pain, Muscle strain, Sciatica, UTI,Spinal fracture, cord compression/cauda equina  Disposition: Pt is 34yo female here with non radiating back pain. BP is  elevated. Endorses hx of HTN but hasn't been taking her medications. Advised her to f/u with PCP ASAP to restart her on her antihtn meds.Remainder of VSS. Physical Exam reassuring without evidence of cord compression or cauda equina, infectious etiology, or spinal fracture. No indication for imaging at this time. Through shared decision with pt , will trial lidocaine patch and muscle relaxer for now. Strict ER and RTC precautions given to pt.  Reviewed AVS with patient. All questions answered. If symptoms worsen go to the Emergency Department. Follow up with PCP in 1 wk. Patient is agreeable with plan.    Disposition: VSS. Physical Exam as below.    SUBJECTIVE/OBJECTIVE:  33 y.o. female with a past medical history of chronic low back pain presents to clinic today with c/o  exacerbation of this pain. Reports she works at [employer] on her feet for long periods of time. Denies any injury or trauma to her back. She denies any pain elsewhere, loss of bowel or bladder control, numbness or tingling including in her groin.  She denies fevers, other bowel or bladder changes. Care prior to arrival includes over-the-counter pain medication with no improvement. Reports she

## 2024-07-05 ENCOUNTER — OFFICE VISIT (OUTPATIENT)
Age: 34
End: 2024-07-05

## 2024-07-05 VITALS
OXYGEN SATURATION: 95 % | SYSTOLIC BLOOD PRESSURE: 152 MMHG | DIASTOLIC BLOOD PRESSURE: 90 MMHG | BODY MASS INDEX: 36.02 KG/M2 | TEMPERATURE: 97.3 F | WEIGHT: 230 LBS | HEART RATE: 92 BPM

## 2024-07-05 DIAGNOSIS — M54.42 ACUTE RIGHT-SIDED LOW BACK PAIN WITH LEFT-SIDED SCIATICA: Primary | ICD-10-CM

## 2024-07-05 RX ORDER — PREDNISONE 20 MG/1
20 TABLET ORAL 2 TIMES DAILY
Qty: 14 TABLET | Refills: 0 | Status: SHIPPED | OUTPATIENT
Start: 2024-07-05 | End: 2024-07-12

## 2024-07-05 RX ORDER — KETOROLAC TROMETHAMINE 30 MG/ML
60 INJECTION, SOLUTION INTRAMUSCULAR; INTRAVENOUS ONCE
Status: COMPLETED | OUTPATIENT
Start: 2024-07-05 | End: 2024-07-05

## 2024-07-05 RX ADMIN — KETOROLAC TROMETHAMINE 60 MG: 30 INJECTION, SOLUTION INTRAMUSCULAR; INTRAVENOUS at 09:23

## 2024-07-05 ASSESSMENT — ENCOUNTER SYMPTOMS
VOMITING: 0
NAUSEA: 0
BACK PAIN: 1

## 2024-07-05 NOTE — PATIENT INSTRUCTIONS
Take medication as prescribed.   Rest and Increase fluids.  Use heating pad to back as needed to soothe pain.   We will call with Xray results.     Call and make an appointment with Orthopedics if this back pain does not subside for you and continues to give you issue.   If any worsening of symptoms go to ER for further workup and assessment.

## 2024-07-05 NOTE — PROGRESS NOTES
since she endorses really since childhood but as been more substantial and bothersome int he last 6 months. 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 Has used muscle relaxers and different medications for her back pain that have worked well for her in the past but in the last 1-2 months she states they are not working as well anymore. She now has sciatic pain that travels down her leg buttock, leg and to foot. Denies urinary incontinence or issues.     Vitals:    07/05/24 0857 07/05/24 0913   BP: (!) 162/104 (!) 152/90   Pulse: 92    Temp: 97.3 °F (36.3 °C)    TempSrc: Infrared    SpO2: 95%    Weight: 104.3 kg (230 lb)        Review of Systems   Constitutional:  Negative for fatigue and fever.   Gastrointestinal:  Negative for nausea and vomiting.   Genitourinary:  Negative for difficulty urinating and dysuria.   Musculoskeletal:  Positive for back pain. Negative for gait problem.   Skin:  Negative for rash and wound.       Physical Exam  Constitutional:       General: She is not in acute distress.     Appearance: Normal appearance.   Eyes:      Pupils: Pupils are equal, round, and reactive to light.   Cardiovascular:      Rate and Rhythm: Normal rate and regular rhythm.   Pulmonary:      Effort: Pulmonary effort is normal. No respiratory distress.      Breath sounds: Normal breath sounds.   Musculoskeletal:      Lumbar back: Decreased range of motion. Positive left straight leg raise test.      Comments: Lumbar back acutely ttp-mild palpation caused her to move across the room. Acutely spasmed to right lumbar paraspinal. Can perform ROM but acutely painful with position change   Skin:     Findings: No bruising.   Neurological:      Mental Status: She is alert and oriented to person, place, and time.   Psychiatric:         Mood and Affect: Mood normal.         Behavior: Behavior normal.           An electronic signature was used to authenticate this note.    --Kathryn Carlisle PA-C

## 2024-07-06 ENCOUNTER — TELEPHONE (OUTPATIENT)
Age: 34
End: 2024-07-06

## 2024-07-06 DIAGNOSIS — M51.36 DEGENERATIVE LUMBAR DISC: Primary | ICD-10-CM

## 2024-07-06 NOTE — TELEPHONE ENCOUNTER
Gave patient her xray results as they were not back when she was in the office.  Radiologist findings:    No evidence of acute osseous abnormality. 2. Findings consistent with presence of degenerative/discogenic disease at the L4-L5 level as described above.      Patient acknowledged and will follow up with [facility] as discussed. Patient given phone number. And referral placed.

## 2024-07-09 ENCOUNTER — OFFICE VISIT (OUTPATIENT)
Dept: PRIMARY CARE CLINIC | Age: 34
End: 2024-07-09
Payer: COMMERCIAL

## 2024-07-09 VITALS
SYSTOLIC BLOOD PRESSURE: 160 MMHG | HEIGHT: 68 IN | OXYGEN SATURATION: 98 % | BODY MASS INDEX: 34.56 KG/M2 | DIASTOLIC BLOOD PRESSURE: 112 MMHG | HEART RATE: 76 BPM | WEIGHT: 228 LBS

## 2024-07-09 DIAGNOSIS — F17.201 TOBACCO DEPENDENCE IN REMISSION: ICD-10-CM

## 2024-07-09 DIAGNOSIS — Z76.89 ENCOUNTER TO ESTABLISH CARE: ICD-10-CM

## 2024-07-09 DIAGNOSIS — I10 PRIMARY HYPERTENSION: ICD-10-CM

## 2024-07-09 DIAGNOSIS — M54.42 ACUTE RIGHT-SIDED LOW BACK PAIN WITH LEFT-SIDED SCIATICA: Primary | ICD-10-CM

## 2024-07-09 DIAGNOSIS — Z78.9 ALCOHOL USE: ICD-10-CM

## 2024-07-09 PROBLEM — F10.90 ALCOHOL USE: Status: ACTIVE | Noted: 2024-07-09

## 2024-07-09 PROCEDURE — 99214 OFFICE O/P EST MOD 30 MIN: CPT

## 2024-07-09 PROCEDURE — 3077F SYST BP >= 140 MM HG: CPT

## 2024-07-09 PROCEDURE — 3080F DIAST BP >= 90 MM HG: CPT

## 2024-07-09 RX ORDER — LISINOPRIL 10 MG/1
10 TABLET ORAL DAILY
Qty: 30 TABLET | Refills: 1 | Status: CANCELLED | OUTPATIENT
Start: 2024-07-09

## 2024-07-09 RX ORDER — AMLODIPINE BESYLATE 5 MG/1
5 TABLET ORAL DAILY
Qty: 30 TABLET | Refills: 1 | Status: SHIPPED | OUTPATIENT
Start: 2024-07-09

## 2024-07-09 SDOH — ECONOMIC STABILITY: INCOME INSECURITY: HOW HARD IS IT FOR YOU TO PAY FOR THE VERY BASICS LIKE FOOD, HOUSING, MEDICAL CARE, AND HEATING?: NOT VERY HARD

## 2024-07-09 SDOH — HEALTH STABILITY: PHYSICAL HEALTH: ON AVERAGE, HOW MANY DAYS PER WEEK DO YOU ENGAGE IN MODERATE TO STRENUOUS EXERCISE (LIKE A BRISK WALK)?: 0 DAYS

## 2024-07-09 SDOH — ECONOMIC STABILITY: FOOD INSECURITY: WITHIN THE PAST 12 MONTHS, THE FOOD YOU BOUGHT JUST DIDN'T LAST AND YOU DIDN'T HAVE MONEY TO GET MORE.: NEVER TRUE

## 2024-07-09 SDOH — ECONOMIC STABILITY: HOUSING INSECURITY
IN THE LAST 12 MONTHS, WAS THERE A TIME WHEN YOU DID NOT HAVE A STEADY PLACE TO SLEEP OR SLEPT IN A SHELTER (INCLUDING NOW)?: NO

## 2024-07-09 SDOH — ECONOMIC STABILITY: FOOD INSECURITY: WITHIN THE PAST 12 MONTHS, YOU WORRIED THAT YOUR FOOD WOULD RUN OUT BEFORE YOU GOT MONEY TO BUY MORE.: NEVER TRUE

## 2024-07-09 SDOH — HEALTH STABILITY: PHYSICAL HEALTH: ON AVERAGE, HOW MANY MINUTES DO YOU ENGAGE IN EXERCISE AT THIS LEVEL?: 0 MIN

## 2024-07-09 ASSESSMENT — PATIENT HEALTH QUESTIONNAIRE - PHQ9
SUM OF ALL RESPONSES TO PHQ9 QUESTIONS 1 & 2: 0
SUM OF ALL RESPONSES TO PHQ QUESTIONS 1-9: 0
SUM OF ALL RESPONSES TO PHQ QUESTIONS 1-9: 0
1. LITTLE INTEREST OR PLEASURE IN DOING THINGS: NOT AT ALL
SUM OF ALL RESPONSES TO PHQ QUESTIONS 1-9: 0
SUM OF ALL RESPONSES TO PHQ QUESTIONS 1-9: 0
2. FEELING DOWN, DEPRESSED OR HOPELESS: NOT AT ALL

## 2024-07-09 NOTE — PROGRESS NOTES
Blanche Hensley   YOB: 1990    Date of Visit:  7/9/2024     No Known Allergies    Outpatient Medications Marked as Taking for the 7/9/24 encounter (Office Visit) with Angie Alston MD   Medication Sig Dispense Refill    amLODIPine (NORVASC) 5 MG tablet Take 1 tablet by mouth daily 30 tablet 1    predniSONE (DELTASONE) 20 MG tablet Take 1 tablet by mouth 2 times daily for 7 days 14 tablet 0       Wt Readings from Last 3 Encounters:   07/09/24 103.4 kg (228 lb)   07/05/24 104.3 kg (230 lb)   02/20/24 104.3 kg (230 lb)     BP Readings from Last 3 Encounters:   07/09/24 (!) 160/112   07/05/24 (!) 152/90   02/20/24 (!) 162/85         HPI:  Patient is a 33 y.o. female who complains of a 6 month(s) history of bilateral lower back pain.  Pain is sharp in nature, with radiation to foot, thigh, buttock.  Pain is persistent, typically moderate in intensity, and is exacerbated by extension, walking, and lying down.  Associated symptoms: paresthesias- intermittent tingling without numbness.  Patient reports the following CPR Red Flags: Negative for history of cancer, pain awakening patient from sleep, night sweats, fevers, unintentional weight loss, immunospuression, saddle anesthesia, new bowel or bladder dysfunction, and osteoporosis.  Precipitating factors: lifting/moving heavy object- for work. Patient's history includes recurrent self limited episodes of low back pain in the past.  Previous treatment: acetaminophen- with mild relief, NSAID- naproxen with mild relief, muscle relaxant- zanalfex with mild relief, oral steroid- prednisone currently taking.  Diagnostic testing:  XR Lumbar Spine with Moderate disc space narrowing with mild degenerative spondylosis identified at the L4-L5 level.  Symptoms are worsening over time.        ROS:  As documented in HPI    PHYSICAL EXAM:  Vitals:    07/09/24 1103   BP: (!) 160/112   Site: Left Upper Arm   Position: Sitting   Pulse: 76   SpO2: 98%   Weight: 103.4 kg

## 2024-07-09 NOTE — PROGRESS NOTES
PROGRESS NOTE   [facility]                                  [address], [address], [facility] [address]         Phone: [phone]    Date of Service:  7/9/2024     Patient ID: .Blanche Hensley is a 33 y.o. female      Subjective:     CC:   Chief Complaint   Patient presents with    New Patient     Follow up from urgent care visit for back pain          HPI  Blanche Hensley is a 33 y.o. female with a Hx of hypertension and vertigo who presents today to [facility] care. Patient was recently evaluated for low back pain at urgent care on 07/05/2024 and was started on prednisone 20 mg 2 times daily.    Low back pain  Patient is a 33 y.o. female who complains of a 6 month(s) history of bilateral lower back pain.  Pain is sharp in nature, with radiation to foot, thigh, buttock.  Pain is persistent, typically moderate in intensity, and is exacerbated by extension, walking, and lying down.  Associated symptoms: paresthesias- intermittent tingling without numbness.  Patient reports the following CPR Red Flags: Negative for history of cancer, pain awakening patient from sleep, night sweats, fevers, unintentional weight loss, immunospuression, saddle anesthesia, new bowel or bladder dysfunction, and osteoporosis.  Precipitating factors: lifting/moving heavy object- for work. Patient's history includes recurrent self limited episodes of low back pain in the past.  Previous treatment: acetaminophen- with mild relief, NSAID- naproxen with mild relief, muscle relaxant- zanalfex with mild relief, oral steroid- prednisone currently taking.  Diagnostic testing:  XR Lumbar Spine with Moderate disc space narrowing with mild degenerative spondylosis identified at the L4-L5 level.  Symptoms are worsening over time.  Reports sharp shooting pain running down her left buttocks, thighs, leg and foot posteriorly/plantar.    Has been mostly lying in bed.

## 2024-07-16 ENCOUNTER — HOSPITAL ENCOUNTER (OUTPATIENT)
Age: 34
Setting detail: SPECIMEN
Discharge: HOME | End: 2024-07-16
Payer: COMMERCIAL

## 2024-07-16 ENCOUNTER — OFFICE VISIT (OUTPATIENT)
Dept: PRIMARY CARE CLINIC | Age: 34
End: 2024-07-16
Payer: COMMERCIAL

## 2024-07-16 VITALS
DIASTOLIC BLOOD PRESSURE: 88 MMHG | HEART RATE: 111 BPM | WEIGHT: 218.2 LBS | TEMPERATURE: 98.1 F | SYSTOLIC BLOOD PRESSURE: 136 MMHG | BODY MASS INDEX: 33.18 KG/M2 | OXYGEN SATURATION: 99 %

## 2024-07-16 DIAGNOSIS — I10 PRIMARY HYPERTENSION: ICD-10-CM

## 2024-07-16 DIAGNOSIS — M54.42 ACUTE RIGHT-SIDED LOW BACK PAIN WITH LEFT-SIDED SCIATICA: Primary | ICD-10-CM

## 2024-07-16 DIAGNOSIS — Z78.9 ALCOHOL USE: ICD-10-CM

## 2024-07-16 LAB
ALBUMIN SERPL-MCNC: 4.8 G/DL (ref 3.4–5)
ALP SERPL-CCNC: 73 U/L (ref 40–129)
ALT SERPL-CCNC: 32 U/L (ref 10–40)
ANION GAP SERPL CALCULATED.3IONS-SCNC: 15 MMOL/L (ref 3–16)
AST SERPL-CCNC: 18 U/L (ref 15–37)
BILIRUB DIRECT SERPL-MCNC: <0.2 MG/DL (ref 0–0.3)
BILIRUB INDIRECT SERPL-MCNC: ABNORMAL MG/DL (ref 0–1)
BILIRUB SERPL-MCNC: 0.3 MG/DL (ref 0–1)
BUN SERPL-MCNC: 14 MG/DL (ref 7–20)
CALCIUM SERPL-MCNC: 10.8 MG/DL (ref 8.3–10.6)
CHLORIDE SERPL-SCNC: 98 MMOL/L (ref 99–110)
CO2 SERPL-SCNC: 25 MMOL/L (ref 21–32)
CREAT SERPL-MCNC: 0.7 MG/DL (ref 0.6–1.1)
GFR SERPLBLD CREATININE-BSD FMLA CKD-EPI: >90 ML/MIN/{1.73_M2}
GLUCOSE SERPL-MCNC: 67 MG/DL (ref 70–99)
POTASSIUM SERPL-SCNC: 4.5 MMOL/L (ref 3.5–5.1)
PROT SERPL-MCNC: 8.5 G/DL (ref 6.4–8.2)
SODIUM SERPL-SCNC: 138 MMOL/L (ref 136–145)

## 2024-07-16 PROCEDURE — 80076 HEPATIC FUNCTION PANEL: CPT

## 2024-07-16 PROCEDURE — 3079F DIAST BP 80-89 MM HG: CPT

## 2024-07-16 PROCEDURE — 80048 BASIC METABOLIC PNL TOTAL CA: CPT

## 2024-07-16 PROCEDURE — 3075F SYST BP GE 130 - 139MM HG: CPT

## 2024-07-16 PROCEDURE — 36415 COLL VENOUS BLD VENIPUNCTURE: CPT

## 2024-07-16 PROCEDURE — 99213 OFFICE O/P EST LOW 20 MIN: CPT

## 2024-07-16 RX ORDER — NAPROXEN SODIUM 550 MG/1
550 TABLET ORAL 2 TIMES DAILY WITH MEALS
Qty: 60 TABLET | Refills: 0 | Status: SHIPPED | OUTPATIENT
Start: 2024-07-16

## 2024-07-16 RX ORDER — TIZANIDINE 2 MG/1
2 TABLET ORAL 3 TIMES DAILY PRN
Qty: 90 TABLET | Refills: 0 | Status: SHIPPED | OUTPATIENT
Start: 2024-07-16

## 2024-07-16 NOTE — ASSESSMENT & PLAN NOTE
-Not well-controlled  -Patient has physical therapy first appointment tomorrow 7/17/2024.  -Will start medication with naproxen 500 mg twice daily.  Will also add muscle relaxer tizanidine 2 mg 3 times daily.

## 2024-07-16 NOTE — PROGRESS NOTES
PROGRESS NOTE   [facility]                                  [address], [address], [place] [address]         Phone: [phone]    Date of Service:  7/16/2024     Patient ID: Tran Hensley is a 33 y.o. female      Subjective:     CC:   Chief Complaint   Patient presents with    Hypertension     1 week f/u on BP          HPI  Mario Hensley is a 33-year-old female with past medical history of primary hypertension alcohol use and tobacco dependence in remission who presents for follow-up evaluation for hypertension medication initiation.    Since last visit on 7/9/2024 patient has been utilizing her amlodipine as prescribed.  She has not been taking her blood pressure at home as she cannot find her blood pressure cuff.  As such she will try and get a new device.  Denies symptoms of blurry vision, headaches, palpitations at this time.    Reports that the Tylenol and naproxen have not been helping with her back pain.  She has been utilizing 250 mg of naproxen 2 times daily and Tylenol 500 mg as needed.  She started using a walker due to difficulty standing up straight.  She has not seen physical therapy yet her first appointment is tomorrow on 7/17/2024.    ROS:    As per HPI        Vitals:    07/16/24 1118   BP: 136/88   Site: Left Upper Arm   Position: Sitting   Cuff Size: Small Adult   Pulse: (!) 111   Temp: 98.1 °F (36.7 °C)   TempSrc: Oral   SpO2: 99%   Weight: 99 kg (218 lb 3.2 oz)       Allergies:  Patient has no known allergies.    Outpatient Medications Marked as Taking for the 7/16/24 encounter (Office Visit) with Angie Alston MD   Medication Sig Dispense Refill    naproxen sodium (ANAPROX) 550 MG tablet Take 1 tablet by mouth 2 times daily (with meals) 60 tablet 0    tiZANidine (ZANAFLEX) 2 MG tablet Take 1 tablet by mouth 3 times daily as needed (for back pain) 90 tablet 0    amLODIPine (NORVASC) 5 MG tablet Take 1

## 2024-07-16 NOTE — ASSESSMENT & PLAN NOTE
-Not completely under control but improving.  Decrease of BP from initial evaluation of 160/112 to 136/88.  -Continue amlodipine 5 mg daily  -Advised patient to bring BP log alongside blood pressure machine for calibration at next visit in 1 month.

## 2024-07-17 ENCOUNTER — HOSPITAL ENCOUNTER (OUTPATIENT)
Dept: PHYSICAL THERAPY | Age: 34
Setting detail: THERAPIES SERIES
End: 2024-07-17
Payer: COMMERCIAL

## 2024-07-18 ENCOUNTER — PATIENT MESSAGE (OUTPATIENT)
Dept: PRIMARY CARE CLINIC | Age: 34
End: 2024-07-18

## 2024-07-19 ENCOUNTER — HOSPITAL ENCOUNTER (OUTPATIENT)
Dept: PHYSICAL THERAPY | Age: 34
Setting detail: THERAPIES SERIES
Discharge: HOME | End: 2024-07-19
Payer: COMMERCIAL

## 2024-07-19 DIAGNOSIS — R29.898 WEAKNESS OF BOTH HIPS: ICD-10-CM

## 2024-07-19 DIAGNOSIS — R26.89 IMPAIRED GAIT AND MOBILITY: ICD-10-CM

## 2024-07-19 DIAGNOSIS — R52 INCREASED PAIN: ICD-10-CM

## 2024-07-19 DIAGNOSIS — M53.86 DECREASED RANGE OF MOTION OF LUMBAR SPINE: Primary | ICD-10-CM

## 2024-07-19 PROCEDURE — 97161 PT EVAL LOW COMPLEX 20 MIN: CPT

## 2024-07-19 PROCEDURE — 97140 MANUAL THERAPY 1/> REGIONS: CPT

## 2024-07-19 PROCEDURE — 97110 THERAPEUTIC EXERCISES: CPT

## 2024-07-19 NOTE — TELEPHONE ENCOUNTER
Tried reaching out to patient on [phone], but went to voicemail. Patient cancelled PT appointment on 7/17/2024. Has another apointment setup on 7/19/2024. Need PT input to complete paper work. Also need to speak to patient directly on phone.  Angie Alston M.D., PGY-2  [facility]

## 2024-07-19 NOTE — PLAN OF CARE
Co-morbidities  profession/work barriers    Physical Therapy Evaluation Complexity Justification  [x] A history of present problem and 1-2 personal factors and/or co-morbidities that impact the plan of care  [x] A total of 3 elements found upon examination of body systems using standardized tests and measures addressing any of the following: body structures, functions (impairments), activity limitations, and/or participation restrictions  [x] A clinical presentation with stable and/or uncomplicated characteristics   [x] Clinical decision making of LOW (55275 - Typically 20 minutes face-to-face) complexity using standardized patient assessment instrument and/or measurable assessment of functional outcome.    Today's Assessment: See above    Medical Necessity Documentation:  I certify that this patient meets the below criteria necessary for medical necessity for care and/or justification of therapy services:  The patient has a musculoskeletal condition(s) with a corresponding ICD-10 code that is of complexity and severity that require skilled therapeutic intervention. This has a direct and significant impact on the need for therapy and significantly impacts the rate of recovery.     Return to Play: NA    Prognosis for POC: [x] Good [] Fair  [] Poor    Patient requires continued skilled intervention: [x] Yes  [] No      CHARGE CAPTURE     PT CHARGE GRID   CPT Code (TIMED) minutes # CPT Code (UNTIMED) #     Therex (69313)  16 1  EVAL:LOW (12680 - Typically 20 minutes face-to-face) 1    Neuromusc. Re-ed (61106)    Re-Eval (18214)     Manual (33806) 8 1  Estim Unattended (51886)     Ther. Act (51359)    Mech. Traction (12921)     Gait (39212)    Dry Needle 1-2 muscle (23832)     Aquatic Therex (29914)    Dry Needle 3+ muscle (20561)     Iontophoresis (48078)    VASO (12956)     Ultrasound (46340)    Group Therapy (98384)     Estim Attended (11831)    Canalith Repositioning (24831)     Other:    Other:    Total Timed

## 2024-07-19 NOTE — TELEPHONE ENCOUNTER
We have patient fmla forms I will scan the blank forms into her media and place them in your folder

## 2024-07-22 NOTE — TELEPHONE ENCOUNTER
Tried reaching out to patient on [phone], but [facility] around 2:00 pm and thus was unable to communicate with her directly. Since patient has been evaluated by PT, her requested paper work was completed using the PT input only.  Angie Alston M.D., PGY-2  [facility]

## 2024-07-23 ENCOUNTER — HOSPITAL ENCOUNTER (OUTPATIENT)
Dept: PHYSICAL THERAPY | Age: 34
Setting detail: THERAPIES SERIES
Discharge: HOME | End: 2024-07-23
Payer: COMMERCIAL

## 2024-07-23 PROCEDURE — 97110 THERAPEUTIC EXERCISES: CPT

## 2024-07-23 PROCEDURE — 97140 MANUAL THERAPY 1/> REGIONS: CPT

## 2024-07-23 PROCEDURE — 97112 NEUROMUSCULAR REEDUCATION: CPT

## 2024-07-23 NOTE — FLOWSHEET NOTE
[facility] - Outpatient Rehabilitation and Therapy [address], [place] [address] office: [phone] fax: [phone]    Physical Therapy: TREATMENT/PROGRESS NOTE   Patient: Blanche Hensley (33 y.o. female)   Examination Date: 2024   :  1990 MRN: [id number]   Visit #: 2 / 5  Insurance Allowable Auth Needed   60 v combined harm max []Yes    [x]No    Insurance: Payor: BCBS / Plan: BCBS - [place] PPO / Product Type: *No Product type* /   Insurance ID: [id number] - ([place] [employer])  Secondary Insurance (if applicable):    Treatment Diagnosis:     ICD-10-CM    1. Decreased range of motion of lumbar spine  M53.86       2. Weakness of both hips  R29.898       3. Increased pain  R52       4. Impaired gait and mobility  R26.89          Medical Diagnosis:  Lumbago with sciatica, left side [M54.42]     SPINE CENTER REFERRAL, 50% in 5 visits or refer back to MD   Referring Physician: Angie Alston MD  PCP: Angie Alston MD     Plan of care signed (Y/N): n, sent 24    Date of Patient follow up with Physician:      Progress Report/POC: no  POC update due: (10 visits /OR AUTH LIMITS, whichever is less) visit 5 or  2024                                             Precautions/ Contra-indications:           Latex allergy:  NO  Pacemaker:    NO  Contraindications for Manipulation:  significant pain currently limiting positions  Date of Surgery: none  Other:    Red Flags:  None    C-SSRS Triggered by Intake questionnaire:   Patient answered 'NO' to both behavioral questions on intake.  No further screening warranted    Preferred Language for Healthcare:   [x] English       [] other:    SUBJECTIVE EXAMINATION     Patient stated complaint: Pt reports she's been trying to do more walking. Next day has increased soreness. Has been doing some of the stretches from day one. Morning is most painful. Can only sleep 3-4 hours at a time, then takes muscle relaxer to fall back asleep. Only

## 2024-07-26 ENCOUNTER — HOSPITAL ENCOUNTER (OUTPATIENT)
Dept: PHYSICAL THERAPY | Age: 34
Setting detail: THERAPIES SERIES
Discharge: HOME | End: 2024-07-26
Payer: COMMERCIAL

## 2024-07-26 PROCEDURE — 97110 THERAPEUTIC EXERCISES: CPT

## 2024-07-26 PROCEDURE — 97140 MANUAL THERAPY 1/> REGIONS: CPT

## 2024-07-26 PROCEDURE — 97112 NEUROMUSCULAR REEDUCATION: CPT

## 2024-07-26 NOTE — FLOWSHEET NOTE
extensibility and allowing for proper ROM for normal function with self care, mobility, lifting and ambulation    GOALS     Patient stated goal: reduce back pain   [] Progressing: [] Met: [] Not Met: [] Adjusted    Therapist goals for Patient:   Short Term Goals: To be achieved in: 2 weeks  1. Independent in HEP and progression per patient tolerance, in order to prevent re-injury.   [] Progressing: [] Met: [] Not Met: [] Adjusted  2. Patient will have a decrease in pain to <5/10 to facilitate improvement in movement, function, and ADLs as indicated by Functional Deficits.  [] Progressing: [] Met: [] Not Met: [] Adjusted    Long Term Goals: To be achieved in: 2.5 weeks  1. Disability index score of 32% or less for the Modified Oswestry to assist with reaching prior level of function with activities such as walking without an AD.  [] Progressing: [] Met: [] Not Met: [] Adjusted  2. Patient will demonstrate increased AROM of lumb spine by 5 deg into ext without pain to allow for proper joint functioning to enable patient to stand erect.   [] Progressing: [] Met: [] Not Met: [] Adjusted  3. Patient will demonstrate increased Strength of 5/5 in B Hips to allow for proper functional mobility to enable patient to return to climbing stairs with good form.   [] Progressing: [] Met: [] Not Met: [] Adjusted  4. Patient will return to rolling in bed without increased symptoms or restriction.   [] Progressing: [] Met: [] Not Met: [] Adjusted       Overall Progression Towards Functional goals/ Treatment Progress Update:  [] Patient is progressing as expected towards functional goals listed.    [] Progression is slowed due to complexities/Impairments listed.  [] Progression has been slowed due to co-morbidities.  [x] Plan just implemented, too soon (<30days) to assess goals progression   [] Goals require adjustment due to lack of progress  [] Patient is not progressing as expected and requires additional follow up with

## 2024-07-30 ENCOUNTER — HOSPITAL ENCOUNTER (OUTPATIENT)
Dept: PHYSICAL THERAPY | Age: 34
Setting detail: THERAPIES SERIES
Discharge: HOME | End: 2024-07-30
Payer: COMMERCIAL

## 2024-07-30 PROCEDURE — 97110 THERAPEUTIC EXERCISES: CPT

## 2024-07-30 PROCEDURE — 97140 MANUAL THERAPY 1/> REGIONS: CPT

## 2024-07-30 PROCEDURE — 97112 NEUROMUSCULAR REEDUCATION: CPT

## 2024-08-01 ENCOUNTER — HOSPITAL ENCOUNTER (OUTPATIENT)
Dept: PHYSICAL THERAPY | Age: 34
Setting detail: THERAPIES SERIES
End: 2024-08-01
Payer: COMMERCIAL

## 2024-08-09 ENCOUNTER — HOSPITAL ENCOUNTER (OUTPATIENT)
Dept: PHYSICAL THERAPY | Age: 34
Setting detail: THERAPIES SERIES
Discharge: HOME | End: 2024-08-09
Payer: COMMERCIAL

## 2024-08-09 PROCEDURE — 97110 THERAPEUTIC EXERCISES: CPT

## 2024-08-09 PROCEDURE — 97140 MANUAL THERAPY 1/> REGIONS: CPT

## 2024-08-09 PROCEDURE — 97530 THERAPEUTIC ACTIVITIES: CPT

## 2024-08-09 NOTE — PLAN OF CARE
[facility] - Outpatient Rehabilitation and Therapy [address], [place] [address] office: [phone] fax: [phone]  Physical Therapy Re-Certification Plan of Care    Dear Angie Alston MD  ,    We had the pleasure of treating the following patient for physical therapy services at [facility]. A summary of our findings can be found in the updated assessment below.  This includes our plan of care.  If you have any questions or concerns regarding these findings, please do not hesitate to contact me at the office phone number checked above.  Thank you for the referral.     Physician Signature:________________________________Date:__________________  By signing above (or electronic signature), therapist's plan is approved by physician      Functional Outcome: GUTIERREZ: 30 / 60%  Blanche Hensley 1990 continues to present with functional deficits in ROM, joint mobility, and endurance of strength  limiting ability with walking on even ground, managing community ambulation, walking up/down stairs, managing bed mobility, carrying items, and lifting items .  During therapy this date, patient required verbal cueing, modification of technique, progression of exercises and program, and manual interventions for exercise progression, improving proper muscle recruitment and activation/motor control patterns, modulating pain, promoting relaxation, allowing for proper ROM, and improving postural awareness. Patient will continue to benefit from ongoing evaluation and advanced clinical decision from a Physical Therapist to improve pain control, muscle strength, endurance, normalization of gait, proper body mechanics, and tolerance to work activity to safely return to PLOF and work/work related tasks without symptoms or restrictions.    Overall Response to Treatment:  Patient is responding fairly to treatment and improvement is noted with regards to goals. Pt cont to

## 2024-08-13 DIAGNOSIS — M54.42 ACUTE RIGHT-SIDED LOW BACK PAIN WITH LEFT-SIDED SCIATICA: ICD-10-CM

## 2024-08-13 RX ORDER — TIZANIDINE 2 MG/1
2 TABLET ORAL 3 TIMES DAILY PRN
Qty: 90 TABLET | Refills: 0 | OUTPATIENT
Start: 2024-08-13

## 2024-08-13 NOTE — TELEPHONE ENCOUNTER
Refill Request       Last Seen: Last Seen Department: 7/16/2024  Last Seen by PCP: 7/16/2024    Last Written: 7/16/24 90 with 0    Next Appointment:   Future Appointments   Date Time Provider Department Center   8/20/2024 12:00 PM Angie Alston MD [facility] DEP     Requested Prescriptions     Pending Prescriptions Disp Refills    tiZANidine (ZANAFLEX) 2 MG tablet [Pharmacy Med Name: TIZANIDINE HCL 2 MG TABLET] 90 tablet 0     Sig: TAKE 1 TABLET BY MOUTH 3 TIMES DAILY AS NEEDED (FOR BACK PAIN)

## 2024-08-14 DIAGNOSIS — I10 PRIMARY HYPERTENSION: ICD-10-CM

## 2024-08-15 RX ORDER — AMLODIPINE BESYLATE 5 MG/1
5 TABLET ORAL DAILY
Qty: 30 TABLET | Refills: 1 | Status: SHIPPED | OUTPATIENT
Start: 2024-08-15

## 2024-08-16 DIAGNOSIS — M54.42 ACUTE RIGHT-SIDED LOW BACK PAIN WITH LEFT-SIDED SCIATICA: ICD-10-CM

## 2024-08-19 NOTE — TELEPHONE ENCOUNTER
Refill Request       Last Seen: Last Seen Department: 7/16/2024  Last Seen by PCP: 7/16/2024    Last Written: 7/16/24 60 with 0    Next Appointment:   Future Appointments   Date Time Provider Department Center   8/20/2024 12:00 PM Angie Alston MD [facility] DEP     Requested Prescriptions     Pending Prescriptions Disp Refills    naproxen sodium (ANAPROX) 550 MG tablet [Pharmacy Med Name: NAPROXEN SODIUM 550 MG TAB] 60 tablet 0     Sig: TAKE 1 TABLET BY MOUTH TWICE A DAY WITH MEALS

## 2024-08-20 ENCOUNTER — OFFICE VISIT (OUTPATIENT)
Dept: PRIMARY CARE CLINIC | Age: 34
End: 2024-08-20
Payer: COMMERCIAL

## 2024-08-20 VITALS
WEIGHT: 231 LBS | TEMPERATURE: 98.1 F | SYSTOLIC BLOOD PRESSURE: 130 MMHG | HEART RATE: 101 BPM | BODY MASS INDEX: 35.01 KG/M2 | DIASTOLIC BLOOD PRESSURE: 82 MMHG | HEIGHT: 68 IN | OXYGEN SATURATION: 98 %

## 2024-08-20 DIAGNOSIS — I10 PRIMARY HYPERTENSION: Primary | ICD-10-CM

## 2024-08-20 DIAGNOSIS — M54.42 ACUTE RIGHT-SIDED LOW BACK PAIN WITH LEFT-SIDED SCIATICA: ICD-10-CM

## 2024-08-20 PROCEDURE — 3079F DIAST BP 80-89 MM HG: CPT

## 2024-08-20 PROCEDURE — 99214 OFFICE O/P EST MOD 30 MIN: CPT

## 2024-08-20 PROCEDURE — 3075F SYST BP GE 130 - 139MM HG: CPT

## 2024-08-20 RX ORDER — NAPROXEN SODIUM 550 MG/1
550 TABLET ORAL 2 TIMES DAILY WITH MEALS
Qty: 60 TABLET | Refills: 0 | OUTPATIENT
Start: 2024-08-20

## 2024-08-20 RX ORDER — NAPROXEN SODIUM 550 MG/1
550 TABLET ORAL 2 TIMES DAILY WITH MEALS
Qty: 60 TABLET | Refills: 0 | Status: SHIPPED | OUTPATIENT
Start: 2024-08-20

## 2024-08-20 NOTE — PROGRESS NOTES
Orthopedic Surgery (Spine), [facility]    Follow-up: In 1 month for blood pressure check and lower back pain  EMR Dragon/transcription disclaimer:  Much of this encounter note is electronic transcription/translation of spoken language to printed texts.  The electronic translation of spoken language may be erroneous, or at times, nonsensical words or phrases may be inadvertently transcribed.  Although I have reviewed the note for such errors, some may still exist.   Patient care was discussed with Dr. Ascencio. Thank you for your supervision.     Angie Alston M.D., PGY-2  [facility] and [facility] Res[facility]

## 2024-08-20 NOTE — ASSESSMENT & PLAN NOTE
Unclear control, continue current medications  -Presenting BP today of 148/86 with elevated pulse of 101.  Repeat blood pressure at the end of the visit at 130/82.  -Patient unable to monitor blood pressure at home due to financial concerns and not being able to afford a blood pressure cuff at this time.  -As such advised patient that if at follow-up her blood pressure stays elevated at presentation we will consider increasing her amlodipine to 10 mg and follow closely  -Continue amlodipine 5 mg daily.  -Patient should follow-up in 1 month

## 2024-08-20 NOTE — ASSESSMENT & PLAN NOTE
-Not completely controlled  -Patient has had 5 physical therapy appointments.  On review of PT notes at 50% goal.  Patient is interested in continuing physical therapy.  Since patient has had significant improvement with physical therapy would advise continued physical therapy.  -Will refill naproxen 500 mg twice daily as requested by patient.  -As the patient's pain continues to cause significant problems with activity of daily living and work: With previous treatment with physical therapy, NSAIDs, muscle relaxers, and oral steroids will further refer to [facility] spine clinic.

## 2024-09-16 DIAGNOSIS — M54.42 ACUTE RIGHT-SIDED LOW BACK PAIN WITH LEFT-SIDED SCIATICA: ICD-10-CM

## 2024-09-17 ENCOUNTER — TELEPHONE (OUTPATIENT)
Dept: ORTHOPEDIC SURGERY | Age: 34
End: 2024-09-17

## 2024-09-17 ENCOUNTER — OFFICE VISIT (OUTPATIENT)
Dept: ORTHOPEDIC SURGERY | Age: 34
End: 2024-09-17
Payer: COMMERCIAL

## 2024-09-17 VITALS — WEIGHT: 231.04 LBS | BODY MASS INDEX: 35.02 KG/M2 | HEIGHT: 68 IN

## 2024-09-17 DIAGNOSIS — M51.26 LUMBAR DISC HERNIATION: Primary | ICD-10-CM

## 2024-09-17 PROCEDURE — 99204 OFFICE O/P NEW MOD 45 MIN: CPT | Performed by: ORTHOPAEDIC SURGERY

## 2024-09-17 RX ORDER — TIZANIDINE 2 MG/1
2 TABLET ORAL 3 TIMES DAILY PRN
Qty: 90 TABLET | Refills: 0 | Status: SHIPPED | OUTPATIENT
Start: 2024-09-17

## 2024-09-17 RX ORDER — GABAPENTIN 300 MG/1
300 CAPSULE ORAL 4 TIMES DAILY
Qty: 120 CAPSULE | Refills: 1 | Status: SHIPPED | OUTPATIENT
Start: 2024-09-17 | End: 2024-11-16

## 2024-09-17 RX ORDER — METHYLPREDNISOLONE 4 MG
TABLET, DOSE PACK ORAL
Qty: 1 KIT | Refills: 0 | Status: SHIPPED | OUTPATIENT
Start: 2024-09-17 | End: 2024-09-23

## 2024-09-17 SDOH — HEALTH STABILITY: PHYSICAL HEALTH: ON AVERAGE, HOW MANY DAYS PER WEEK DO YOU ENGAGE IN MODERATE TO STRENUOUS EXERCISE (LIKE A BRISK WALK)?: 0 DAYS

## 2024-09-19 ENCOUNTER — TELEPHONE (OUTPATIENT)
Dept: ORTHOPEDIC SURGERY | Age: 34
End: 2024-09-19

## 2024-09-19 NOTE — TELEPHONE ENCOUNTER
Prescription Refill     Medication Name:  SEDATIVE FOR MRI  Pharmacy: [address]  Patient Contact Number:  [phone]

## 2024-09-20 DIAGNOSIS — M51.16 LUMBAR DISC HERNIATION WITH RADICULOPATHY: Primary | ICD-10-CM

## 2024-09-20 RX ORDER — ALPRAZOLAM 1 MG
1 TABLET ORAL 2 TIMES DAILY PRN
Qty: 2 TABLET | Refills: 0 | Status: SHIPPED | OUTPATIENT
Start: 2024-09-20 | End: 2024-11-01

## 2024-09-24 ENCOUNTER — TELEPHONE (OUTPATIENT)
Dept: ORTHOPEDIC SURGERY | Age: 34
End: 2024-09-24

## 2024-10-01 ENCOUNTER — HOSPITAL ENCOUNTER (OUTPATIENT)
Dept: MRI IMAGING | Age: 34
Discharge: HOME | End: 2024-10-01
Attending: ORTHOPAEDIC SURGERY
Payer: COMMERCIAL

## 2024-10-01 DIAGNOSIS — M51.26 LUMBAR DISC HERNIATION: ICD-10-CM

## 2024-10-01 PROCEDURE — 72148 MRI LUMBAR SPINE W/O DYE: CPT

## 2024-10-08 ENCOUNTER — OFFICE VISIT (OUTPATIENT)
Dept: ORTHOPEDIC SURGERY | Age: 34
End: 2024-10-08
Payer: COMMERCIAL

## 2024-10-08 ENCOUNTER — OFFICE VISIT (OUTPATIENT)
Dept: INTERNAL MEDICINE CLINIC | Age: 34
End: 2024-10-08
Payer: COMMERCIAL

## 2024-10-08 ENCOUNTER — PATIENT MESSAGE (OUTPATIENT)
Dept: INTERNAL MEDICINE CLINIC | Age: 34
End: 2024-10-08

## 2024-10-08 VITALS — HEIGHT: 67 IN | BODY MASS INDEX: 36.1 KG/M2 | WEIGHT: 230 LBS

## 2024-10-08 VITALS
HEART RATE: 88 BPM | HEIGHT: 67 IN | OXYGEN SATURATION: 99 % | SYSTOLIC BLOOD PRESSURE: 150 MMHG | WEIGHT: 236 LBS | BODY MASS INDEX: 37.04 KG/M2 | DIASTOLIC BLOOD PRESSURE: 90 MMHG

## 2024-10-08 DIAGNOSIS — Z00.00 ANNUAL PHYSICAL EXAM: Primary | ICD-10-CM

## 2024-10-08 DIAGNOSIS — Z91.89 AT RISK FOR SLEEP APNEA: ICD-10-CM

## 2024-10-08 DIAGNOSIS — E66.01 SEVERE OBESITY (BMI 35.0-39.9) WITH COMORBIDITY: ICD-10-CM

## 2024-10-08 DIAGNOSIS — I10 PRIMARY HYPERTENSION: ICD-10-CM

## 2024-10-08 DIAGNOSIS — M51.16 LUMBAR DISC HERNIATION WITH RADICULOPATHY: Primary | ICD-10-CM

## 2024-10-08 DIAGNOSIS — Z78.9 ALCOHOL CONSUMPTION HEAVY: ICD-10-CM

## 2024-10-08 DIAGNOSIS — M51.16 LUMBAR DISC HERNIATION WITH RADICULOPATHY: ICD-10-CM

## 2024-10-08 PROCEDURE — 99214 OFFICE O/P EST MOD 30 MIN: CPT | Performed by: INTERNAL MEDICINE

## 2024-10-08 PROCEDURE — 3077F SYST BP >= 140 MM HG: CPT | Performed by: INTERNAL MEDICINE

## 2024-10-08 PROCEDURE — 3080F DIAST BP >= 90 MM HG: CPT | Performed by: INTERNAL MEDICINE

## 2024-10-08 PROCEDURE — 99395 PREV VISIT EST AGE 18-39: CPT | Performed by: INTERNAL MEDICINE

## 2024-10-08 PROCEDURE — 99214 OFFICE O/P EST MOD 30 MIN: CPT | Performed by: ORTHOPAEDIC SURGERY

## 2024-10-08 RX ORDER — AMLODIPINE BESYLATE 10 MG/1
10 TABLET ORAL DAILY
Qty: 90 TABLET | Refills: 1 | Status: SHIPPED | OUTPATIENT
Start: 2024-10-08

## 2024-10-08 SDOH — HEALTH STABILITY: PHYSICAL HEALTH: ON AVERAGE, HOW MANY DAYS PER WEEK DO YOU ENGAGE IN MODERATE TO STRENUOUS EXERCISE (LIKE A BRISK WALK)?: 0 DAYS

## 2024-10-08 ASSESSMENT — ENCOUNTER SYMPTOMS
COUGH: 0
SHORTNESS OF BREATH: 0
ABDOMINAL PAIN: 0
BACK PAIN: 1
CHEST TIGHTNESS: 0
WHEEZING: 0
CONSTIPATION: 0
VOMITING: 0
SORE THROAT: 0
NAUSEA: 0
COLOR CHANGE: 0

## 2024-10-08 NOTE — ASSESSMENT & PLAN NOTE
Age-related health maintenance and immunization recommendations reviewed and discussed with patient, she is aware of need for updated Tdap, she declined flu vaccine  Labs ordered, healthy diet and regular exercise recommendations made to patient, BMI discussed with patient, see below  Menstrual cycles are regular without concerns, patient is in a homosexual relationship and does not need birth control, encouraged to complete updated Pap/pelvic exam with gynecology  Depression screen is negative

## 2024-10-08 NOTE — ASSESSMENT & PLAN NOTE
Counseled at length regarding need for weight reduction given comorbidities including hypertension, possibly underlying sleep apnea and low back pain with herniated disc.  Recommendations made to reduce carb intake, reduce portion size and overall calorie intake

## 2024-10-08 NOTE — ASSESSMENT & PLAN NOTE
blood pressure remains borderline elevated 3 months after starting amlodipine 5 mg, advised patient will increase dose to 10 mg, reinforced recommendations for salt restriction, weight reduction, reduce alcohol consumption, healthy diet and active lifestyle as other means to help control blood pressure.  She does have symptoms suggestive of underlying sleep apnea and will refer to sleep study.  She will return for follow-up in 12 weeks

## 2024-10-08 NOTE — PROGRESS NOTES
New Patient: LUMBAR SPINE    Referring Provider:  No ref. provider found    CHIEF COMPLAINT: Low back pain    HISTORY OF PRESENT ILLNESS:    Ms. Blanche Hensley  is a pleasant 34 y.o. female presents with a 1 year history of low back and left leg pain which she rates 7/10.  She was numbness tingling weakness of her left leg.  She denies saddle anesthesia and bowel or bladder dysfunction.      Current/Past Treatment:   Physical Therapy: Yes  Chiropractic: No  Injection: No  Medications: Gabapentin, Anaprox and Zanaflex    Past Medical History:   Past Medical History:   Diagnosis Date    Alcohol use     Conductive hearing loss of left ear     Primary pulmonary HTN (HCC)     Tobacco dependence in remission       Past Surgical History:     Noncontributory  Current Medications:     Current Outpatient Medications:     ALPRAZolam (XANAX) 1 MG tablet, Take 1 tablet by mouth 2 times daily as needed for Anxiety for up to 2 doses. Take 1 one hour before mri, repeat just before mri if necessary Max Daily Amount: 2 mg, Disp: 2 tablet, Rfl: 0    tiZANidine (ZANAFLEX) 2 MG tablet, TAKE 1 TABLET BY MOUTH 3 TIMES DAILY AS NEEDED (FOR BACK PAIN), Disp: 90 tablet, Rfl: 0    gabapentin (NEURONTIN) 300 MG capsule, Take 1 capsule by mouth 4 times daily for 240 doses., Disp: 120 capsule, Rfl: 1    naproxen sodium (ANAPROX) 550 MG tablet, Take 1 tablet by mouth 2 times daily (with meals), Disp: 60 tablet, Rfl: 0    amLODIPine (NORVASC) 5 MG tablet, Take 1 tablet by mouth daily, Disp: 30 tablet, Rfl: 1  Allergies:  Patient has no known allergies.  Social History:    reports that she quit smoking about 5 years ago. Her smoking use included cigars and cigarettes. She started smoking about 18 years ago. She has a 8 pack-year smoking history. She has never used smokeless tobacco. She reports current alcohol use of about 6.0 - 7.0 standard drinks of alcohol per week. She reports that she does not use drugs.  Family History:   Family History

## 2024-10-08 NOTE — ASSESSMENT & PLAN NOTE
Partner reports loud snoring and witnessing apnea in addition to patient's high blood pressure reading and BMI higher than normal, advised will need to complete sleep study to rule out underlying sleep apnea, referral placed, encouraged attempting weight loss

## 2024-10-08 NOTE — ASSESSMENT & PLAN NOTE
Consumes multiple alcoholic beverages per day, counseling done and encouraged cutting back to a minimum and preferably complete abstinence, will update labs this visit including liver enzymes

## 2024-10-08 NOTE — ASSESSMENT & PLAN NOTE
Acute onset started about 3 months ago, has been into physical therapy and currently following up with spinal surgery, completed MRI and opted for epidural injection as opposed to surgery.  She has forms that needs to be completed for work for her short-term disability.  Advised will probably need more information from spinal surgery in terms of limitations and return to work date.  She is currently on gabapentin and as needed muscle relaxer, she did complete physical therapy and has been doing virtual exercises and stretches, encouraged to continue same and continue attempts for weight loss

## 2024-10-08 NOTE — PROGRESS NOTES
ASSESSMENT/PLAN:  1. Annual physical exam  Assessment & Plan:  Age-related health maintenance and immunization recommendations reviewed and discussed with patient, she is aware of need for updated Tdap, she declined flu vaccine  Labs ordered, healthy diet and regular exercise recommendations made to patient, BMI discussed with patient, see below  Menstrual cycles are regular without concerns, patient is in a homosexual relationship and does not need birth control, encouraged to complete updated Pap/pelvic exam with gynecology  Depression screen is negative   Orders:  -     CBC; Future  -     Comprehensive Metabolic Panel; Future  -     Hemoglobin A1C; Future  -     Lipid Panel; Future  -     TSH with Reflex to FT4; Future  -     Urinalysis; Future  2. Lumbar disc herniation with radiculopathy  Assessment & Plan:  Acute onset started about 3 months ago, has been into physical therapy and currently following up with spinal surgery, completed MRI and opted for epidural injection as opposed to surgery.  She has forms that needs to be completed for work for her short-term disability.  Advised will probably need more information from spinal surgery in terms of limitations and return to work date.  She is currently on gabapentin and as needed muscle relaxer, she did complete physical therapy and has been doing virtual exercises and stretches, encouraged to continue same and continue attempts for weight loss   3. Severe obesity (BMI 35.0-39.9) with comorbidity (HCC)  Assessment & Plan:  Counseled at length regarding need for weight reduction given comorbidities including hypertension, possibly underlying sleep apnea and low back pain with herniated disc.  Recommendations made to reduce carb intake, reduce portion size and overall calorie intake   Orders:  -     Fermin Pollard MD, Sleep Medicine, [facility]  4. Primary hypertension  Assessment & Plan:    blood pressure remains borderline elevated 3 months

## 2024-10-14 ENCOUNTER — PREP FOR PROCEDURE (OUTPATIENT)
Dept: ORTHOPEDIC SURGERY | Age: 34
End: 2024-10-14

## 2024-10-14 DIAGNOSIS — M51.16 LUMBAR DISC HERNIATION WITH RADICULOPATHY: Primary | ICD-10-CM

## 2024-10-18 NOTE — DISCHARGE INSTRUCTIONS
Postoperative epidural injection instructions    Diet :  Resume normal diet    Activity:  I no driving or operating machinery for 8 hours post procedure  Do not stay alone for 4 hours after the procedure  Resume normal activity if you have full strength and sensation of your lower extremities  You may shower the day of your procedure    Observe puncture site for signs of insect infections such as redness warmth swelling drainage or increased tenderness    Expected side effects:  You may have numbness tingling or weakness of your leg for up to 6 hours  Muscle stiffness or soreness at the puncture site may last for 2 to 4 days    Diabetic patients:  Expect increased glucose levels following epidural steroid injection  Monitor your blood sugars frequently for the first 5 days following procedure  Adjust medicine accordingly    Anticoagulant medication patients  You may resume your anticoagulant medications the day after the injection    Follow-up:  Please call [phone] to schedule postop appointment have not already done so  You may call that number if you are having additional problems following the procedure

## 2024-10-18 NOTE — H&P
HISTORY OF PRESENT ILLNESS:    Ms. Blanche Hensley  is a pleasant 34 y.o. female presents with a 1 year history of low back and left leg pain which she rates 7/10.  She was numbness tingling weakness of her left leg.  She denies saddle anesthesia and bowel or bladder dysfunction.       Current/Past Treatment:   Physical Therapy: Yes  Chiropractic: No  Injection: No  Medications: Gabapentin, Anaprox and Zanaflex     Past Medical History:   Past Medical History        Past Medical History:   Diagnosis Date    Alcohol use      Conductive hearing loss of left ear      Primary pulmonary HTN (HCC)      Tobacco dependence in remission           Past Surgical History:     Noncontributory  Current Medications:     Current Medication      Current Outpatient Medications:     ALPRAZolam (XANAX) 1 MG tablet, Take 1 tablet by mouth 2 times daily as needed for Anxiety for up to 2 doses. Take 1 one hour before mri, repeat just before mri if necessary Max Daily Amount: 2 mg, Disp: 2 tablet, Rfl: 0    tiZANidine (ZANAFLEX) 2 MG tablet, TAKE 1 TABLET BY MOUTH 3 TIMES DAILY AS NEEDED (FOR BACK PAIN), Disp: 90 tablet, Rfl: 0    gabapentin (NEURONTIN) 300 MG capsule, Take 1 capsule by mouth 4 times daily for 240 doses., Disp: 120 capsule, Rfl: 1    naproxen sodium (ANAPROX) 550 MG tablet, Take 1 tablet by mouth 2 times daily (with meals), Disp: 60 tablet, Rfl: 0    amLODIPine (NORVASC) 5 MG tablet, Take 1 tablet by mouth daily, Disp: 30 tablet, Rfl: 1     Allergies:  Patient has no known allergies.  Social History:    reports that she quit smoking about 5 years ago. Her smoking use included cigars and cigarettes. She started smoking about 18 years ago. She has a 8 pack-year smoking history. She has never used smokeless tobacco. She reports current alcohol use of about 6.0 - 7.0 standard drinks of alcohol per week. She reports that she does not use drugs.  Family History:   Family History         Family History   Problem Relation Age of

## 2024-10-23 NOTE — PROGRESS NOTES
[facility] ENDOSCOPY AND OUTPATIENT  PRE-PROCEDURE INSTRUCTIONS    Procedure date__10/29/2024_______Arrival time__1315__________        Procedure time__1415__________       Screening questions for Pain procedures:    Do you have a current infection? __N_______  Are you currently taking an antibiotic?_N_____  Have you stopped all blood thinners for 7 days prior to your procedure?  __Y______  Have all your non-steroidal, anti-inflammatories been held  for 7 days prior to your procedure?___Y_____      It is not necessary to stop eating or drinking prior to this procedure.  We would like you to take your medications for blood pressure as usual.  You may be asked to stop blood thinners such as Coumadin, Plavix, Fragmin, Lovenox, etc., or any anti-inflammatories such as:  Aspirin, Ibuprofen, Advil, Naproxen prior to your procedure.   We also ask that you stop any OTC medications that cause additional bleeding    You must make arrangements for a responsible adult to arrive with you and stay in our waiting area during your procedure.  They will also need to take you home after your procedure.   For your safety you will not be allowed to leave alone or drive yourself home.  No driving the remainder of the day after your procedure.      For your comfort, please wear simple loose fitting clothing to the center.  Please do not bring valuables.      If you have a living will and a durable power of  for healthcare, please bring in a copy.    You will need to bring a photo ID and insurance card    Our goal is to provide you with excellent care so if you have any questions, please contact us at the [facility] at [phone]

## 2024-10-29 ENCOUNTER — HOSPITAL ENCOUNTER (OUTPATIENT)
Age: 34
Setting detail: OUTPATIENT SURGERY
Discharge: HOME | End: 2024-10-29
Attending: ORTHOPAEDIC SURGERY | Admitting: ORTHOPAEDIC SURGERY
Payer: COMMERCIAL

## 2024-10-29 ENCOUNTER — APPOINTMENT (OUTPATIENT)
Dept: INTERVENTIONAL RADIOLOGY/VASCULAR | Age: 34
End: 2024-10-29
Attending: ORTHOPAEDIC SURGERY
Payer: COMMERCIAL

## 2024-10-29 VITALS
RESPIRATION RATE: 16 BRPM | DIASTOLIC BLOOD PRESSURE: 96 MMHG | OXYGEN SATURATION: 100 % | BODY MASS INDEX: 37.04 KG/M2 | HEART RATE: 115 BPM | HEIGHT: 67 IN | TEMPERATURE: 97.2 F | SYSTOLIC BLOOD PRESSURE: 158 MMHG | WEIGHT: 236 LBS

## 2024-10-29 LAB — HCG UR QL: NEGATIVE

## 2024-10-29 PROCEDURE — 3610000054 HC PAIN LEVEL 3 BASE (NON-OR): Performed by: ORTHOPAEDIC SURGERY

## 2024-10-29 PROCEDURE — 2500000003 HC RX 250 WO HCPCS: Performed by: ORTHOPAEDIC SURGERY

## 2024-10-29 PROCEDURE — 2709999900 HC NON-CHARGEABLE SUPPLY: Performed by: ORTHOPAEDIC SURGERY

## 2024-10-29 PROCEDURE — 6360000002 HC RX W HCPCS: Performed by: ORTHOPAEDIC SURGERY

## 2024-10-29 PROCEDURE — 84703 CHORIONIC GONADOTROPIN ASSAY: CPT

## 2024-10-29 PROCEDURE — 6360000004 HC RX CONTRAST MEDICATION: Performed by: ORTHOPAEDIC SURGERY

## 2024-10-29 RX ORDER — IOPAMIDOL 612 MG/ML
INJECTION, SOLUTION INTRAVASCULAR
Status: COMPLETED | OUTPATIENT
Start: 2024-10-29 | End: 2024-10-29

## 2024-10-29 RX ORDER — DEXAMETHASONE SODIUM PHOSPHATE 10 MG/ML
INJECTION INTRAMUSCULAR; INTRAVENOUS
Status: COMPLETED | OUTPATIENT
Start: 2024-10-29 | End: 2024-10-29

## 2024-10-29 RX ORDER — LIDOCAINE HYDROCHLORIDE 10 MG/ML
INJECTION, SOLUTION EPIDURAL; INFILTRATION; INTRACAUDAL; PERINEURAL
Status: COMPLETED | OUTPATIENT
Start: 2024-10-29 | End: 2024-10-29

## 2024-10-29 ASSESSMENT — PAIN - FUNCTIONAL ASSESSMENT
PAIN_FUNCTIONAL_ASSESSMENT: 0-10
PAIN_FUNCTIONAL_ASSESSMENT: NONE - DENIES PAIN
PAIN_FUNCTIONAL_ASSESSMENT: 0-10

## 2024-10-29 NOTE — OP NOTE
Operative Note      Patient: Blanche Hensley  YOB: 1990  MRN: [id number]    Date of Procedure: 10/29/2024    Pre-Op Diagnosis Codes:      * Lumbar disc herniation with radiculopathy [M51.16]    Post-Op Diagnosis: Same       Procedure(s):  LUMBAR TRANSFORAMINAL EPIDURAL STEROID INJECTION - LEFT - L4-L5    Surgeon(s):  Kayla Gates MD    Assistant:   * No surgical staff found *    Anesthesia: Local    Estimated Blood Loss (mL): Minimal    Complications: None    Specimens:   * No specimens in log *    Implants:  * No implants in log *      Drains: * No LDAs found *    Findings:  Infection Present At Time Of Surgery (PATOS) (choose all levels that have infection present):  No infection present  Other Findings: na    Detailed Description of Procedure:     The patient was admitted through pre-op and written consent was obtained.  The patient was advised of the risks and benefits of the procedure, including but not limited to the following: bleeding, pain, infection, temporary paralysis, nerve damage and spinal headache.  The patient was given the opportunity to ask questions.  There were no contraindications for this procedure.    The appropriate area was prepped and draped in a sterile fashion.  Landmarks were identified and marked.  A 22G spinal needle was advanced to the left L4 neural foramen using fluoroscopic guidance with ideal needle tip placement confirmed by multiple views.  Injection of contrast showed epidural flow.  There were no signs of intravascular or intrathecal injection.    10mg of dexamethasone and 1cc 1% lidocaine were then injected.     There were no complications and the patient tolerated the procedure well.  The patient was transferred to the recovery area and monitored.  Discharge instructions were given.  The patient is to contact me for any post-procedure concerns.  The patient is to follow up as scheduled.    Estimated blood loss: none    KAYLA GATES MD, MD

## 2024-10-29 NOTE — H&P
I have reviewed the history and physical and examined the patient and find no relevant changes.    I have reviewed with the patient and/or family the risks, benefits, and alternatives to the procedure.    KAYLA WALTON MD  10/29/2024 No intake/output data recorded.

## 2024-11-07 PROBLEM — Z00.00 ANNUAL PHYSICAL EXAM: Status: RESOLVED | Noted: 2024-10-08 | Resolved: 2024-11-07

## 2024-11-19 ENCOUNTER — TELEPHONE (OUTPATIENT)
Dept: ORTHOPEDIC SURGERY | Age: 34
End: 2024-11-19

## 2024-11-19 ENCOUNTER — OFFICE VISIT (OUTPATIENT)
Dept: ORTHOPEDIC SURGERY | Age: 34
End: 2024-11-19
Payer: COMMERCIAL

## 2024-11-19 VITALS — BODY MASS INDEX: 37.04 KG/M2 | HEIGHT: 67 IN | WEIGHT: 236 LBS

## 2024-11-19 DIAGNOSIS — M51.16 LUMBAR DISC HERNIATION WITH RADICULOPATHY: Primary | ICD-10-CM

## 2024-11-19 PROCEDURE — 99213 OFFICE O/P EST LOW 20 MIN: CPT | Performed by: ORTHOPAEDIC SURGERY

## 2024-11-19 NOTE — PROGRESS NOTES
proceed with transforaminal injection left L4-L5.  We would need to repeat her lumbar MRI if she continues to have right leg symptoms and we were contemplating microlumbar discectomy

## 2024-11-25 ENCOUNTER — TELEPHONE (OUTPATIENT)
Dept: ORTHOPEDIC SURGERY | Age: 34
End: 2024-11-25

## 2024-11-25 NOTE — TELEPHONE ENCOUNTER
Mri approval:    Approval for MRI: of Lumbar   Approval Letter in Media   Approved Facility [facility]   Approval Dates: 11.22.24 to 12.21.24   Auth #: [id number]     Left message to call and schedule MRI and follow up appt with provider for results. [email or web address]

## 2024-12-02 DIAGNOSIS — M48.062 LUMBAR STENOSIS WITH NEUROGENIC CLAUDICATION: Primary | ICD-10-CM

## 2024-12-02 RX ORDER — ALPRAZOLAM 1 MG/1
1 TABLET ORAL 2 TIMES DAILY PRN
Qty: 2 TABLET | Refills: 0 | Status: SHIPPED | OUTPATIENT
Start: 2024-12-02 | End: 2025-01-01

## 2024-12-09 ENCOUNTER — HOSPITAL ENCOUNTER (OUTPATIENT)
Dept: MRI IMAGING | Age: 34
Discharge: HOME | End: 2024-12-09
Attending: ORTHOPAEDIC SURGERY
Payer: COMMERCIAL

## 2024-12-09 DIAGNOSIS — M51.16 LUMBAR DISC HERNIATION WITH RADICULOPATHY: ICD-10-CM

## 2024-12-09 PROCEDURE — 72148 MRI LUMBAR SPINE W/O DYE: CPT

## 2024-12-31 ENCOUNTER — OFFICE VISIT (OUTPATIENT)
Dept: ORTHOPEDIC SURGERY | Age: 34
End: 2024-12-31
Payer: COMMERCIAL

## 2024-12-31 VITALS — BODY MASS INDEX: 37.04 KG/M2 | HEIGHT: 67 IN | WEIGHT: 236 LBS

## 2024-12-31 DIAGNOSIS — M51.16 LUMBAR DISC HERNIATION WITH RADICULOPATHY: Primary | ICD-10-CM

## 2024-12-31 PROCEDURE — 99214 OFFICE O/P EST MOD 30 MIN: CPT | Performed by: ORTHOPAEDIC SURGERY

## 2024-12-31 NOTE — PROGRESS NOTES
New Patient: LUMBAR SPINE    Referring Provider:  No ref. provider found    CHIEF COMPLAINT: Low back pain    HISTORY OF PRESENT ILLNESS:    Ms. Blanche Hensley  is 2 months status post transforaminal injection left L4-L5.  She noted 80% initial improvement of her symptoms following the injection.  Over the two months she has been experiencing right anterior thigh in addition to return of her left L5 distribution pain she presented with a 1 year history of low back and left leg pain which she rates 7/10.  She was numbness tingling weakness of her left leg.  She denies saddle anesthesia and bowel or bladder dysfunction.      Current/Past Treatment:   Physical Therapy: Yes  Chiropractic: No  Injection: TF left L4-L5 10/29/2024  Medications: Gabapentin, Anaprox and Zanaflex    Past Medical History:   Past Medical History:   Diagnosis Date    Alcohol use     Conductive hearing loss of left ear     Primary pulmonary HTN (HCC)     Tobacco dependence in remission       Past Surgical History:     Noncontributory  Current Medications:     Current Outpatient Medications:     ALPRAZolam (XANAX) 1 MG tablet, Take 1 tablet by mouth 2 times daily as needed for Anxiety for up to 2 doses. Max Daily Amount: 2 mg, Disp: 2 tablet, Rfl: 0    acetaminophen (TYLENOL) 500 MG CAPS capsule, Take 1 capsule by mouth every 6 hours as needed for Pain, Disp: , Rfl:     amLODIPine (NORVASC) 10 MG tablet, Take 1 tablet by mouth daily, Disp: 90 tablet, Rfl: 1    tiZANidine (ZANAFLEX) 2 MG tablet, TAKE 1 TABLET BY MOUTH 3 TIMES DAILY AS NEEDED (FOR BACK PAIN), Disp: 90 tablet, Rfl: 0    naproxen sodium (ANAPROX) 550 MG tablet, Take 1 tablet by mouth 2 times daily (with meals), Disp: 60 tablet, Rfl: 0    gabapentin (NEURONTIN) 300 MG capsule, Take 1 capsule by mouth 4 times daily for 240 doses., Disp: 120 capsule, Rfl: 1  Allergies:  Patient has no known allergies.  Social History:    reports that she quit smoking about 5 years ago. Her smoking

## 2025-01-02 DIAGNOSIS — M54.42 ACUTE RIGHT-SIDED LOW BACK PAIN WITH LEFT-SIDED SCIATICA: ICD-10-CM

## 2025-01-02 RX ORDER — TIZANIDINE 2 MG/1
2 TABLET ORAL 3 TIMES DAILY PRN
Qty: 90 TABLET | Refills: 0 | OUTPATIENT
Start: 2025-01-02

## 2025-01-02 NOTE — TELEPHONE ENCOUNTER
This patient has switched PCPs and is no longer under our care. She will need to request refills from her new PCP.

## 2025-01-02 NOTE — TELEPHONE ENCOUNTER
Refill Request       Last Seen: Last Seen Department: 8/20/2024  Last Seen by PCP: 8/20/2024    Last Written: 9/17/24    Next Appointment: 1/7/25  Future Appointments   Date Time Provider Department Center   1/7/2025  2:20 PM Farzaneh Blackburn MD [facility] DEP       Future appointment scheduled      Requested Prescriptions     Pending Prescriptions Disp Refills    tiZANidine (ZANAFLEX) 2 MG tablet 90 tablet 0     Sig: Take 1 tablet by mouth 3 times daily as needed (for back pain)

## 2025-01-08 ENCOUNTER — TELEPHONE (OUTPATIENT)
Dept: ORTHOPEDIC SURGERY | Age: 35
End: 2025-01-08

## 2025-01-15 ENCOUNTER — OFFICE VISIT (OUTPATIENT)
Dept: INTERNAL MEDICINE CLINIC | Age: 35
End: 2025-01-15

## 2025-01-15 VITALS
SYSTOLIC BLOOD PRESSURE: 116 MMHG | HEART RATE: 107 BPM | BODY MASS INDEX: 38.31 KG/M2 | OXYGEN SATURATION: 98 % | DIASTOLIC BLOOD PRESSURE: 80 MMHG | WEIGHT: 244.6 LBS

## 2025-01-15 DIAGNOSIS — Z23 NEED FOR TDAP VACCINATION: ICD-10-CM

## 2025-01-15 DIAGNOSIS — Z78.9 ALCOHOL CONSUMPTION HEAVY: ICD-10-CM

## 2025-01-15 DIAGNOSIS — Z00.00 ANNUAL PHYSICAL EXAM: ICD-10-CM

## 2025-01-15 DIAGNOSIS — I10 PRIMARY HYPERTENSION: Primary | ICD-10-CM

## 2025-01-15 DIAGNOSIS — M51.16 LUMBAR DISC HERNIATION WITH RADICULOPATHY: ICD-10-CM

## 2025-01-15 DIAGNOSIS — E66.01 SEVERE OBESITY (BMI 35.0-39.9) WITH COMORBIDITY: ICD-10-CM

## 2025-01-15 DIAGNOSIS — Z91.89 AT RISK FOR SLEEP APNEA: ICD-10-CM

## 2025-01-15 PROBLEM — M54.42 ACUTE RIGHT-SIDED LOW BACK PAIN WITH LEFT-SIDED SCIATICA: Status: RESOLVED | Noted: 2024-07-16 | Resolved: 2025-01-15

## 2025-01-15 RX ORDER — TIZANIDINE 2 MG/1
2 TABLET ORAL 3 TIMES DAILY PRN
Qty: 90 TABLET | Refills: 0 | Status: SHIPPED | OUTPATIENT
Start: 2025-01-15

## 2025-01-15 SDOH — ECONOMIC STABILITY: FOOD INSECURITY: WITHIN THE PAST 12 MONTHS, THE FOOD YOU BOUGHT JUST DIDN'T LAST AND YOU DIDN'T HAVE MONEY TO GET MORE.: NEVER TRUE

## 2025-01-15 SDOH — ECONOMIC STABILITY: FOOD INSECURITY: WITHIN THE PAST 12 MONTHS, YOU WORRIED THAT YOUR FOOD WOULD RUN OUT BEFORE YOU GOT MONEY TO BUY MORE.: NEVER TRUE

## 2025-01-15 SDOH — ECONOMIC STABILITY: INCOME INSECURITY: IN THE LAST 12 MONTHS, WAS THERE A TIME WHEN YOU WERE NOT ABLE TO PAY THE MORTGAGE OR RENT ON TIME?: YES

## 2025-01-15 ASSESSMENT — PATIENT HEALTH QUESTIONNAIRE - PHQ9
SUM OF ALL RESPONSES TO PHQ QUESTIONS 1-9: 0
SUM OF ALL RESPONSES TO PHQ QUESTIONS 1-9: 0
2. FEELING DOWN, DEPRESSED OR HOPELESS: NOT AT ALL
SUM OF ALL RESPONSES TO PHQ QUESTIONS 1-9: 0
1. LITTLE INTEREST OR PLEASURE IN DOING THINGS: NOT AT ALL
SUM OF ALL RESPONSES TO PHQ QUESTIONS 1-9: 0
1. LITTLE INTEREST OR PLEASURE IN DOING THINGS: NOT AT ALL
SUM OF ALL RESPONSES TO PHQ9 QUESTIONS 1 & 2: 0
SUM OF ALL RESPONSES TO PHQ9 QUESTIONS 1 & 2: 0
2. FEELING DOWN, DEPRESSED OR HOPELESS: NOT AT ALL

## 2025-01-15 ASSESSMENT — ENCOUNTER SYMPTOMS
WHEEZING: 0
COUGH: 0
COLOR CHANGE: 0
BACK PAIN: 1
CONSTIPATION: 0
ABDOMINAL PAIN: 0
CHEST TIGHTNESS: 0
VOMITING: 0
SHORTNESS OF BREATH: 0
SORE THROAT: 0
NAUSEA: 0

## 2025-01-15 NOTE — ASSESSMENT & PLAN NOTE
Blood pressure is stable and well-controlled on current dose of amlodipine, will continue same, reinforced recommendations for salt restriction, weight reduction, healthy diet and active lifestyle, continue attempts to discontinue alcohol use and continue abstinence from smoking

## 2025-01-15 NOTE — PROGRESS NOTES
ASSESSMENT/PLAN:  1. Primary hypertension  Assessment & Plan:  Blood pressure is stable and well-controlled on current dose of amlodipine, will continue same, reinforced recommendations for salt restriction, weight reduction, healthy diet and active lifestyle, continue attempts to discontinue alcohol use and continue abstinence from smoking   2. Alcohol consumption heavy  Assessment & Plan:    still reports average of 5 beers per day, again counseled at length regarding need to cut back with goal of complete abstinence, encouraged to get lab work that was ordered for her previously completed since she never did  3. Severe obesity (BMI 35.0-39.9) with comorbidity (HCC)  Assessment & Plan:  Weight is up by 8 pounds since last visit 3 months ago, patient reports this is mostly due to her not working and staying home.  Diet and exercise recommendations reinforced, encouraged to get lab work completed, if any evidence of prediabetes then may be a candidate to start GLP-1 agent however explained to patient this is adjunctive to diet and lifestyle changes   4. Lumbar disc herniation with radiculopathy  Assessment & Plan:  Patient will continue care and recommendation as per spinal surgery, she does have upcoming appointment at [facility], advised will do a temporary refill of tizanidine since she is out however recommended she will still need to get both gabapentin and tizanidine from spinal surgery since they are still actively managing her back problem and have her off work and plan for future surgery.  Reinforced recommendations to adhere to daily stretches and core strengthening exercises that were taught to her by physical therapy and continue attempts for weight loss   Orders:  -     tiZANidine (ZANAFLEX) 2 MG tablet; Take 1 tablet by mouth 3 times daily as needed (for back pain), Disp-90 tablet, R-0Normal  5. At risk for sleep apnea  Assessment & Plan:  Never scheduled with sleep study, BMI unfortunately

## 2025-01-15 NOTE — ASSESSMENT & PLAN NOTE
Never scheduled with sleep study, BMI unfortunately went up since last visit due to reset weight gain.  Reinforced recommendations for weight loss and encouraged to call and schedule with sleep medicine to complete evaluation for obstructive sleep apnea

## 2025-01-15 NOTE — ASSESSMENT & PLAN NOTE
Patient will continue care and recommendation as per spinal surgery, she does have upcoming appointment at [facility], advised will do a temporary refill of tizanidine since she is out however recommended she will still need to get both gabapentin and tizanidine from spinal surgery since they are still actively managing her back problem and have her off work and plan for future surgery.  Reinforced recommendations to adhere to daily stretches and core strengthening exercises that were taught to her by physical therapy and continue attempts for weight loss

## 2025-01-15 NOTE — ASSESSMENT & PLAN NOTE
still reports average of 5 beers per day, again counseled at length regarding need to cut back with goal of complete abstinence, encouraged to get lab work that was ordered for her previously completed since she never did

## 2025-01-15 NOTE — ASSESSMENT & PLAN NOTE
Weight is up by 8 pounds since last visit 3 months ago, patient reports this is mostly due to her not working and staying home.  Diet and exercise recommendations reinforced, encouraged to get lab work completed, if any evidence of prediabetes then may be a candidate to start GLP-1 agent however explained to patient this is adjunctive to diet and lifestyle changes

## 2025-01-16 LAB
ALBUMIN SERPL-MCNC: 4.7 G/DL (ref 3.4–5)
ALBUMIN/GLOB SERPL: 1.5 {RATIO} (ref 1.1–2.2)
ALP SERPL-CCNC: 78 U/L (ref 40–129)
ALT SERPL-CCNC: 37 U/L (ref 10–40)
ANION GAP SERPL CALCULATED.3IONS-SCNC: 14 MMOL/L (ref 3–16)
AST SERPL-CCNC: 31 U/L (ref 15–37)
BACTERIA URNS QL MICRO: ABNORMAL /HPF
BILIRUB SERPL-MCNC: <0.2 MG/DL (ref 0–1)
BILIRUB UR QL STRIP.AUTO: NEGATIVE
BUN SERPL-MCNC: 9 MG/DL (ref 7–20)
CALCIUM SERPL-MCNC: 9.6 MG/DL (ref 8.3–10.6)
CHLORIDE SERPL-SCNC: 101 MMOL/L (ref 99–110)
CHOLEST SERPL-MCNC: 214 MG/DL (ref 0–199)
CLARITY UR: CLEAR
CO2 SERPL-SCNC: 26 MMOL/L (ref 21–32)
COLOR UR: YELLOW
CREAT SERPL-MCNC: 0.8 MG/DL (ref 0.6–1.1)
DEPRECATED RDW RBC AUTO: 14.5 % (ref 12.4–15.4)
EPI CELLS #/AREA URNS AUTO: 12 /HPF (ref 0–5)
EST. AVERAGE GLUCOSE BLD GHB EST-MCNC: 76.7 MG/DL
GFR SERPLBLD CREATININE-BSD FMLA CKD-EPI: >90 ML/MIN/{1.73_M2}
GLUCOSE SERPL-MCNC: 86 MG/DL (ref 70–99)
GLUCOSE UR STRIP.AUTO-MCNC: NEGATIVE MG/DL
HBA1C MFR BLD: 4.3 %
HCT VFR BLD AUTO: 36.4 % (ref 36–48)
HDLC SERPL-MCNC: 56 MG/DL (ref 40–60)
HGB BLD-MCNC: 11.8 G/DL (ref 12–16)
HGB UR QL STRIP.AUTO: NEGATIVE
HYALINE CASTS #/AREA URNS AUTO: 0 /LPF (ref 0–8)
KETONES UR STRIP.AUTO-MCNC: NEGATIVE MG/DL
LDLC SERPL CALC-MCNC: 142 MG/DL
LEUKOCYTE ESTERASE UR QL STRIP.AUTO: NEGATIVE
MCH RBC QN AUTO: 27.2 PG (ref 26–34)
MCHC RBC AUTO-ENTMCNC: 32.4 G/DL (ref 31–36)
MCV RBC AUTO: 84 FL (ref 80–100)
NITRITE UR QL STRIP.AUTO: NEGATIVE
PH UR STRIP.AUTO: 6 [PH] (ref 5–8)
PLATELET # BLD AUTO: 393 K/UL (ref 135–450)
PMV BLD AUTO: 7.4 FL (ref 5–10.5)
POTASSIUM SERPL-SCNC: 4.2 MMOL/L (ref 3.5–5.1)
PROT SERPL-MCNC: 7.8 G/DL (ref 6.4–8.2)
PROT UR STRIP.AUTO-MCNC: 30 MG/DL
RBC # BLD AUTO: 4.33 M/UL (ref 4–5.2)
RBC CLUMPS #/AREA URNS AUTO: 1 /HPF (ref 0–4)
SODIUM SERPL-SCNC: 141 MMOL/L (ref 136–145)
SP GR UR STRIP.AUTO: 1.02 (ref 1–1.03)
TRIGL SERPL-MCNC: 78 MG/DL (ref 0–150)
TSH SERPL DL<=0.005 MIU/L-ACNC: 0.9 UIU/ML (ref 0.27–4.2)
UA DIPSTICK W REFLEX MICRO PNL UR: YES
URN SPEC COLLECT METH UR: ABNORMAL
UROBILINOGEN UR STRIP-ACNC: 0.2 E.U./DL
VLDLC SERPL CALC-MCNC: 16 MG/DL
WBC # BLD AUTO: 5.3 K/UL (ref 4–11)
WBC #/AREA URNS AUTO: 2 /HPF (ref 0–5)

## 2025-01-21 ENCOUNTER — PREP FOR PROCEDURE (OUTPATIENT)
Dept: ORTHOPEDIC SURGERY | Age: 35
End: 2025-01-21

## 2025-01-24 ENCOUNTER — TELEPHONE (OUTPATIENT)
Dept: ORTHOPEDIC SURGERY | Age: 35
End: 2025-01-24

## 2025-01-28 RX ORDER — GABAPENTIN 300 MG/1
300 CAPSULE ORAL 4 TIMES DAILY
Qty: 120 CAPSULE | Refills: 1 | Status: SHIPPED | OUTPATIENT
Start: 2025-01-28 | End: 2025-03-29

## 2025-01-30 ENCOUNTER — PATIENT MESSAGE (OUTPATIENT)
Dept: INTERNAL MEDICINE CLINIC | Age: 35
End: 2025-01-30

## 2025-01-30 NOTE — TELEPHONE ENCOUNTER
Please advise patient disability papers should be completed by the physician who is actively treating her for the disabling condition.  She needs to talk again to Dr. Kody streeter

## 2025-02-11 DIAGNOSIS — M51.16 LUMBAR DISC HERNIATION WITH RADICULOPATHY: ICD-10-CM

## 2025-02-11 RX ORDER — TIZANIDINE 2 MG/1
2 TABLET ORAL 3 TIMES DAILY PRN
Qty: 90 TABLET | Refills: 0 | Status: SHIPPED | OUTPATIENT
Start: 2025-02-11

## 2025-02-25 ENCOUNTER — OFFICE VISIT (OUTPATIENT)
Dept: INTERNAL MEDICINE CLINIC | Age: 35
End: 2025-02-25
Payer: COMMERCIAL

## 2025-02-25 VITALS
OXYGEN SATURATION: 97 % | HEIGHT: 67 IN | SYSTOLIC BLOOD PRESSURE: 122 MMHG | WEIGHT: 242.2 LBS | HEART RATE: 88 BPM | BODY MASS INDEX: 38.01 KG/M2 | DIASTOLIC BLOOD PRESSURE: 74 MMHG

## 2025-02-25 DIAGNOSIS — I10 PRIMARY HYPERTENSION: ICD-10-CM

## 2025-02-25 DIAGNOSIS — Z78.9 ALCOHOL CONSUMPTION HEAVY: ICD-10-CM

## 2025-02-25 DIAGNOSIS — Z01.818 PREOP EXAM FOR INTERNAL MEDICINE: Primary | ICD-10-CM

## 2025-02-25 PROCEDURE — 99214 OFFICE O/P EST MOD 30 MIN: CPT | Performed by: INTERNAL MEDICINE

## 2025-02-25 PROCEDURE — 3074F SYST BP LT 130 MM HG: CPT | Performed by: INTERNAL MEDICINE

## 2025-02-25 PROCEDURE — 3078F DIAST BP <80 MM HG: CPT | Performed by: INTERNAL MEDICINE

## 2025-02-25 ASSESSMENT — ENCOUNTER SYMPTOMS
CHEST TIGHTNESS: 0
SORE THROAT: 0
WHEEZING: 0
COUGH: 0
CONSTIPATION: 0
VOMITING: 0
COLOR CHANGE: 0
BACK PAIN: 1
SHORTNESS OF BREATH: 0
NAUSEA: 0
ABDOMINAL PAIN: 0

## 2025-02-25 NOTE — H&P (VIEW-ONLY)
Allergic/Immunologic: Negative for environmental allergies and immunocompromised state.   Neurological:  Negative for dizziness, seizures, light-headedness and headaches.   Psychiatric/Behavioral:  Negative for behavioral problems, dysphoric mood and sleep disturbance. The patient is not nervous/anxious.        OBJECTIVE:    /74 (Site: Left Upper Arm, Position: Sitting, Cuff Size: Large Adult)   Pulse 88   Ht 1.702 m (5' 7\")   Wt 109.9 kg (242 lb 3.2 oz)   SpO2 97%   BMI 37.93 kg/m²    Physical Exam  Constitutional:       General: She is not in acute distress.     Appearance: Normal appearance. She is normal weight. She is not toxic-appearing.   HENT:      Head: Normocephalic.      Right Ear: There is no impacted cerumen.      Left Ear: There is no impacted cerumen.      Mouth/Throat:      Mouth: Mucous membranes are moist.      Pharynx: Oropharynx is clear. No oropharyngeal exudate.   Eyes:      General: No scleral icterus.     Conjunctiva/sclera: Conjunctivae normal.   Neck:      Vascular: No carotid bruit.   Cardiovascular:      Rate and Rhythm: Normal rate and regular rhythm.      Heart sounds: Normal heart sounds. No murmur heard.  Pulmonary:      Effort: Pulmonary effort is normal. No respiratory distress.      Breath sounds: No wheezing or rales.   Abdominal:      Palpations: Abdomen is soft.      Tenderness: There is no abdominal tenderness. There is no rebound.   Musculoskeletal:         General: Tenderness present. Normal range of motion.      Cervical back: Neck supple.      Right lower leg: No edema.      Left lower leg: No edema.   Skin:     General: Skin is warm and dry.   Neurological:      General: No focal deficit present.      Mental Status: She is alert.      Cranial Nerves: No cranial nerve deficit.      Motor: No weakness.      Gait: Gait normal.   Psychiatric:         Mood and Affect: Mood normal.         Behavior: Behavior normal.           Electronically signed by Farzaneh Blackburn

## 2025-02-25 NOTE — PROGRESS NOTES
ASSESSMENT/PLAN:  1. Preop exam for internal medicine  Assessment & Plan:  History and physical completed, patient had general anesthesia in the past with good tolerance and denies any side effects, she is not on any blood thinners, and she is aware to avoid aspirin and NSAIDs 1 week before the surgery and can only use Tylenol for pain.  Counseled regarding need to discontinue heavy alcohol consumption and be alcohol free at least 1 week before the surgery  She will complete lab work today, she is medically ready for surgery provided that her lab work results are within normal   2. Primary hypertension  Assessment & Plan:  Blood pressure stable and well-controlled on current medications   3. Alcohol consumption heavy  Assessment & Plan:  Patient aware she needs to reduce alcohol consumption and advised she should be alcohol free at least 1 week before her upcoming surgery.  Explained to patient risk of alcohol withdrawal if does not do that, encouraged to call the office if any problems       Return if symptoms worsen or fail to improve.     SUBJECTIVE  HPI:   Here for preop exam and medical clearance, she is scheduled to have bilateral L4-5 laminectomy and vasectomy at [place] on the 11 March        Review of Systems   Constitutional:  Negative for activity change, appetite change, fatigue and unexpected weight change.   HENT:  Negative for congestion, hearing loss, mouth sores and sore throat.    Eyes:  Negative for visual disturbance.   Respiratory:  Negative for cough, chest tightness, shortness of breath and wheezing.    Cardiovascular:  Negative for chest pain, palpitations and leg swelling.   Gastrointestinal:  Negative for abdominal pain, constipation, nausea and vomiting.   Genitourinary:  Negative for difficulty urinating, dysuria, frequency, hematuria and urgency.   Musculoskeletal:  Positive for back pain. Negative for arthralgias, gait problem and joint swelling.   Skin:  Negative for color change.

## 2025-02-25 NOTE — ASSESSMENT & PLAN NOTE
Patient aware she needs to reduce alcohol consumption and advised she should be alcohol free at least 1 week before her upcoming surgery.  Explained to patient risk of alcohol withdrawal if does not do that, encouraged to call the office if any problems

## 2025-02-25 NOTE — ASSESSMENT & PLAN NOTE
History and physical completed, patient had general anesthesia in the past with good tolerance and denies any side effects, she is not on any blood thinners, and she is aware to avoid aspirin and NSAIDs 1 week before the surgery and can only use Tylenol for pain.  Counseled regarding need to discontinue heavy alcohol consumption and be alcohol free at least 1 week before the surgery  She will complete lab work today, she is medically ready for surgery provided that her lab work results are within normal

## 2025-03-04 NOTE — PROGRESS NOTES
[facility] PRE-SURGICAL TESTING INSTRUCTIONS                      PRIOR TO PROCEDURE DATE:    1. PLEASE FOLLOW ANY INSTRUCTIONS GIVEN TO YOU PER YOUR SURGEON.      2. Arrange for someone to drive you home and be with you for the first 24 hours after discharge for your safety after your procedure for which you received sedation. Ensure it is someone we can share information with regarding your discharge.     NOTE: At this time ONLY 2 ADULTS may accompany you   One person ENCOURAGED to stay at hospital entire time if outpatient surgery      3. You must contact your surgeon for instructions IF:  You are taking any blood thinners, aspirin, anti-inflammatory or vitamins.  There is a change in your physical condition such as a cold, fever, rash, cuts, sores, or any other infection, especially near your surgical site.    4. Do not drink alcohol the day before or day of your procedure.  Do not use any recreational marijuana at least 24 hours or street drugs (heroin, cocaine) at minimum 5 days prior to your procedure.     5. A Pre-Surgical History and Physical MUST be completed WITHIN 30 DAYS OR LESS prior to your procedure.by your Physician or an Urgent Care        THE DAY OF YOUR PROCEDURE:  1.  Follow instructions for ARRIVAL TIME as DIRECTED BY YOUR SURGEON.     2. Enter the MAIN entrance from [facility] and follow the signs to the free Parking Garage or  Parking (offered free of charge 7 am-5pm).      3. Enter the Main Entrance of the hospital (do not enter from the lower level of the parking garage). Upon entrance, check in with the  at the surgical information desk on your LEFT.   Bring your insurance card and photo ID to register      4. DO NOT EAT ANYTHING 8 hours prior to arrival for surgery.  You may have up to 8 ounces of water 4 hours prior to your arrival for surgery.   NOTE: ALL Gastric, Bariatric & Bowel surgery patients - you MUST follow your surgeon's instructions regarding

## 2025-03-10 ENCOUNTER — ANESTHESIA EVENT (OUTPATIENT)
Dept: OPERATING ROOM | Age: 35
End: 2025-03-10
Payer: COMMERCIAL

## 2025-03-11 ENCOUNTER — APPOINTMENT (OUTPATIENT)
Dept: GENERAL RADIOLOGY | Age: 35
End: 2025-03-11
Attending: STUDENT IN AN ORGANIZED HEALTH CARE EDUCATION/TRAINING PROGRAM
Payer: COMMERCIAL

## 2025-03-11 ENCOUNTER — HOSPITAL ENCOUNTER (OUTPATIENT)
Age: 35
Setting detail: OBSERVATION
Discharge: HOME | End: 2025-03-12
Attending: STUDENT IN AN ORGANIZED HEALTH CARE EDUCATION/TRAINING PROGRAM | Admitting: STUDENT IN AN ORGANIZED HEALTH CARE EDUCATION/TRAINING PROGRAM
Payer: COMMERCIAL

## 2025-03-11 ENCOUNTER — ANESTHESIA (OUTPATIENT)
Dept: OPERATING ROOM | Age: 35
End: 2025-03-11
Payer: COMMERCIAL

## 2025-03-11 DIAGNOSIS — Z98.1 S/P LUMBAR FUSION: Primary | ICD-10-CM

## 2025-03-11 PROBLEM — M54.16 LUMBAR RADICULOPATHY: Status: ACTIVE | Noted: 2025-03-11

## 2025-03-11 LAB
ABO/RH: NORMAL
ANTIBODY SCREEN: NORMAL
APTT BLD: 26.6 SEC (ref 22.1–36.4)
HCG UR QL: NEGATIVE

## 2025-03-11 PROCEDURE — 2060000000 HC ICU INTERMEDIATE R&B

## 2025-03-11 PROCEDURE — 3600000014 HC SURGERY LEVEL 4 ADDTL 15MIN: Performed by: STUDENT IN AN ORGANIZED HEALTH CARE EDUCATION/TRAINING PROGRAM

## 2025-03-11 PROCEDURE — 6360000002 HC RX W HCPCS: Performed by: STUDENT IN AN ORGANIZED HEALTH CARE EDUCATION/TRAINING PROGRAM

## 2025-03-11 PROCEDURE — 2709999900 HC NON-CHARGEABLE SUPPLY: Performed by: STUDENT IN AN ORGANIZED HEALTH CARE EDUCATION/TRAINING PROGRAM

## 2025-03-11 PROCEDURE — 3600000004 HC SURGERY LEVEL 4 BASE: Performed by: STUDENT IN AN ORGANIZED HEALTH CARE EDUCATION/TRAINING PROGRAM

## 2025-03-11 PROCEDURE — 7100000001 HC PACU RECOVERY - ADDTL 15 MIN: Performed by: STUDENT IN AN ORGANIZED HEALTH CARE EDUCATION/TRAINING PROGRAM

## 2025-03-11 PROCEDURE — 86850 RBC ANTIBODY SCREEN: CPT

## 2025-03-11 PROCEDURE — 6360000002 HC RX W HCPCS

## 2025-03-11 PROCEDURE — 2500000003 HC RX 250 WO HCPCS: Performed by: STUDENT IN AN ORGANIZED HEALTH CARE EDUCATION/TRAINING PROGRAM

## 2025-03-11 PROCEDURE — 3700000000 HC ANESTHESIA ATTENDED CARE: Performed by: STUDENT IN AN ORGANIZED HEALTH CARE EDUCATION/TRAINING PROGRAM

## 2025-03-11 PROCEDURE — C1729 CATH, DRAINAGE: HCPCS | Performed by: STUDENT IN AN ORGANIZED HEALTH CARE EDUCATION/TRAINING PROGRAM

## 2025-03-11 PROCEDURE — 6370000000 HC RX 637 (ALT 250 FOR IP)

## 2025-03-11 PROCEDURE — 2500000003 HC RX 250 WO HCPCS

## 2025-03-11 PROCEDURE — 6360000002 HC RX W HCPCS: Performed by: ANESTHESIOLOGY

## 2025-03-11 PROCEDURE — 86900 BLOOD TYPING SEROLOGIC ABO: CPT

## 2025-03-11 PROCEDURE — 2720000010 HC SURG SUPPLY STERILE: Performed by: STUDENT IN AN ORGANIZED HEALTH CARE EDUCATION/TRAINING PROGRAM

## 2025-03-11 PROCEDURE — 7100000000 HC PACU RECOVERY - FIRST 15 MIN: Performed by: STUDENT IN AN ORGANIZED HEALTH CARE EDUCATION/TRAINING PROGRAM

## 2025-03-11 PROCEDURE — 72020 X-RAY EXAM OF SPINE 1 VIEW: CPT

## 2025-03-11 PROCEDURE — 6370000000 HC RX 637 (ALT 250 FOR IP): Performed by: STUDENT IN AN ORGANIZED HEALTH CARE EDUCATION/TRAINING PROGRAM

## 2025-03-11 PROCEDURE — 85730 THROMBOPLASTIN TIME PARTIAL: CPT

## 2025-03-11 PROCEDURE — 2580000003 HC RX 258

## 2025-03-11 PROCEDURE — 86901 BLOOD TYPING SEROLOGIC RH(D): CPT

## 2025-03-11 PROCEDURE — 84703 CHORIONIC GONADOTROPIN ASSAY: CPT

## 2025-03-11 PROCEDURE — 2580000003 HC RX 258: Performed by: ANESTHESIOLOGY

## 2025-03-11 PROCEDURE — 3700000001 HC ADD 15 MINUTES (ANESTHESIA): Performed by: STUDENT IN AN ORGANIZED HEALTH CARE EDUCATION/TRAINING PROGRAM

## 2025-03-11 RX ORDER — METHYLPREDNISOLONE ACETATE 80 MG/ML
INJECTION, SUSPENSION INTRA-ARTICULAR; INTRALESIONAL; INTRAMUSCULAR; SOFT TISSUE PRN
Status: DISCONTINUED | OUTPATIENT
Start: 2025-03-11 | End: 2025-03-11 | Stop reason: HOSPADM

## 2025-03-11 RX ORDER — DEXAMETHASONE SODIUM PHOSPHATE 4 MG/ML
INJECTION, SOLUTION INTRA-ARTICULAR; INTRALESIONAL; INTRAMUSCULAR; INTRAVENOUS; SOFT TISSUE
Status: DISCONTINUED | OUTPATIENT
Start: 2025-03-11 | End: 2025-03-11 | Stop reason: SDUPTHER

## 2025-03-11 RX ORDER — SODIUM CHLORIDE, SODIUM LACTATE, POTASSIUM CHLORIDE, CALCIUM CHLORIDE 600; 310; 30; 20 MG/100ML; MG/100ML; MG/100ML; MG/100ML
INJECTION, SOLUTION INTRAVENOUS
Status: DISCONTINUED | OUTPATIENT
Start: 2025-03-11 | End: 2025-03-11 | Stop reason: SDUPTHER

## 2025-03-11 RX ORDER — FENTANYL CITRATE 50 UG/ML
25 INJECTION, SOLUTION INTRAMUSCULAR; INTRAVENOUS EVERY 5 MIN PRN
Status: COMPLETED | OUTPATIENT
Start: 2025-03-11 | End: 2025-03-11

## 2025-03-11 RX ORDER — AMLODIPINE BESYLATE 10 MG/1
10 TABLET ORAL DAILY
Status: DISCONTINUED | OUTPATIENT
Start: 2025-03-12 | End: 2025-03-12 | Stop reason: HOSPADM

## 2025-03-11 RX ORDER — PROPOFOL 10 MG/ML
INJECTION, EMULSION INTRAVENOUS
Status: DISCONTINUED | OUTPATIENT
Start: 2025-03-11 | End: 2025-03-11 | Stop reason: SDUPTHER

## 2025-03-11 RX ORDER — ONDANSETRON 2 MG/ML
4 INJECTION INTRAMUSCULAR; INTRAVENOUS EVERY 6 HOURS PRN
Status: DISCONTINUED | OUTPATIENT
Start: 2025-03-11 | End: 2025-03-12 | Stop reason: HOSPADM

## 2025-03-11 RX ORDER — HALOPERIDOL 5 MG/ML
1 INJECTION INTRAMUSCULAR
Status: DISCONTINUED | OUTPATIENT
Start: 2025-03-11 | End: 2025-03-11 | Stop reason: HOSPADM

## 2025-03-11 RX ORDER — ESMOLOL HYDROCHLORIDE 10 MG/ML
INJECTION INTRAVENOUS
Status: DISCONTINUED | OUTPATIENT
Start: 2025-03-11 | End: 2025-03-11 | Stop reason: SDUPTHER

## 2025-03-11 RX ORDER — ONDANSETRON 2 MG/ML
INJECTION INTRAMUSCULAR; INTRAVENOUS
Status: DISCONTINUED | OUTPATIENT
Start: 2025-03-11 | End: 2025-03-11 | Stop reason: SDUPTHER

## 2025-03-11 RX ORDER — PROCHLORPERAZINE EDISYLATE 5 MG/ML
5 INJECTION INTRAMUSCULAR; INTRAVENOUS
Status: COMPLETED | OUTPATIENT
Start: 2025-03-11 | End: 2025-03-11

## 2025-03-11 RX ORDER — SODIUM CHLORIDE 9 MG/ML
INJECTION, SOLUTION INTRAVENOUS PRN
Status: DISCONTINUED | OUTPATIENT
Start: 2025-03-11 | End: 2025-03-11 | Stop reason: HOSPADM

## 2025-03-11 RX ORDER — ROCURONIUM BROMIDE 10 MG/ML
INJECTION, SOLUTION INTRAVENOUS
Status: DISCONTINUED | OUTPATIENT
Start: 2025-03-11 | End: 2025-03-11 | Stop reason: SDUPTHER

## 2025-03-11 RX ORDER — METHOCARBAMOL 750 MG/1
750 TABLET, FILM COATED ORAL EVERY 8 HOURS
Status: COMPLETED | OUTPATIENT
Start: 2025-03-11 | End: 2025-03-12

## 2025-03-11 RX ORDER — DEXMEDETOMIDINE HYDROCHLORIDE 100 UG/ML
INJECTION, SOLUTION INTRAVENOUS
Status: DISCONTINUED | OUTPATIENT
Start: 2025-03-11 | End: 2025-03-11 | Stop reason: SDUPTHER

## 2025-03-11 RX ORDER — OXYCODONE HYDROCHLORIDE 5 MG/1
5 TABLET ORAL EVERY 4 HOURS PRN
Status: DISCONTINUED | OUTPATIENT
Start: 2025-03-11 | End: 2025-03-12 | Stop reason: HOSPADM

## 2025-03-11 RX ORDER — SODIUM CHLORIDE, SODIUM LACTATE, POTASSIUM CHLORIDE, CALCIUM CHLORIDE 600; 310; 30; 20 MG/100ML; MG/100ML; MG/100ML; MG/100ML
INJECTION, SOLUTION INTRAVENOUS CONTINUOUS
Status: DISCONTINUED | OUTPATIENT
Start: 2025-03-11 | End: 2025-03-11 | Stop reason: HOSPADM

## 2025-03-11 RX ORDER — ACETAMINOPHEN 500 MG
1000 TABLET ORAL ONCE
Status: DISCONTINUED | OUTPATIENT
Start: 2025-03-11 | End: 2025-03-12 | Stop reason: HOSPADM

## 2025-03-11 RX ORDER — HYDROMORPHONE HYDROCHLORIDE 1 MG/ML
0.5 INJECTION, SOLUTION INTRAMUSCULAR; INTRAVENOUS; SUBCUTANEOUS EVERY 5 MIN PRN
Status: COMPLETED | OUTPATIENT
Start: 2025-03-11 | End: 2025-03-11

## 2025-03-11 RX ORDER — MORPHINE SULFATE 2 MG/ML
2 INJECTION, SOLUTION INTRAMUSCULAR; INTRAVENOUS
Status: DISCONTINUED | OUTPATIENT
Start: 2025-03-11 | End: 2025-03-12

## 2025-03-11 RX ORDER — GABAPENTIN 300 MG/1
300 CAPSULE ORAL 4 TIMES DAILY
Status: DISCONTINUED | OUTPATIENT
Start: 2025-03-11 | End: 2025-03-12 | Stop reason: HOSPADM

## 2025-03-11 RX ORDER — ENOXAPARIN SODIUM 100 MG/ML
30 INJECTION SUBCUTANEOUS 2 TIMES DAILY
Status: DISCONTINUED | OUTPATIENT
Start: 2025-03-12 | End: 2025-03-12 | Stop reason: HOSPADM

## 2025-03-11 RX ORDER — SODIUM CHLORIDE 9 MG/ML
INJECTION, SOLUTION INTRAVENOUS PRN
Status: DISCONTINUED | OUTPATIENT
Start: 2025-03-11 | End: 2025-03-12 | Stop reason: HOSPADM

## 2025-03-11 RX ORDER — MIDAZOLAM HYDROCHLORIDE 1 MG/ML
INJECTION, SOLUTION INTRAMUSCULAR; INTRAVENOUS
Status: DISCONTINUED | OUTPATIENT
Start: 2025-03-11 | End: 2025-03-11 | Stop reason: SDUPTHER

## 2025-03-11 RX ORDER — SODIUM CHLORIDE 0.9 % (FLUSH) 0.9 %
5-40 SYRINGE (ML) INJECTION EVERY 12 HOURS SCHEDULED
Status: DISCONTINUED | OUTPATIENT
Start: 2025-03-11 | End: 2025-03-11 | Stop reason: HOSPADM

## 2025-03-11 RX ORDER — OXYCODONE HYDROCHLORIDE 5 MG/1
10 TABLET ORAL EVERY 4 HOURS PRN
Status: DISCONTINUED | OUTPATIENT
Start: 2025-03-11 | End: 2025-03-12 | Stop reason: HOSPADM

## 2025-03-11 RX ORDER — LIDOCAINE HYDROCHLORIDE 20 MG/ML
INJECTION, SOLUTION INTRAVENOUS
Status: DISCONTINUED | OUTPATIENT
Start: 2025-03-11 | End: 2025-03-11 | Stop reason: SDUPTHER

## 2025-03-11 RX ORDER — HYDROMORPHONE HYDROCHLORIDE 1 MG/ML
0.5 INJECTION, SOLUTION INTRAMUSCULAR; INTRAVENOUS; SUBCUTANEOUS
Refills: 0 | Status: DISCONTINUED | OUTPATIENT
Start: 2025-03-11 | End: 2025-03-12

## 2025-03-11 RX ORDER — MIDAZOLAM HYDROCHLORIDE 1 MG/ML
INJECTION, SOLUTION INTRAMUSCULAR; INTRAVENOUS
Status: COMPLETED
Start: 2025-03-11 | End: 2025-03-11

## 2025-03-11 RX ORDER — MORPHINE SULFATE 4 MG/ML
4 INJECTION INTRAVENOUS
Status: DISCONTINUED | OUTPATIENT
Start: 2025-03-11 | End: 2025-03-12

## 2025-03-11 RX ORDER — SODIUM CHLORIDE 0.9 % (FLUSH) 0.9 %
5-40 SYRINGE (ML) INJECTION PRN
Status: DISCONTINUED | OUTPATIENT
Start: 2025-03-11 | End: 2025-03-12 | Stop reason: HOSPADM

## 2025-03-11 RX ORDER — SODIUM CHLORIDE 9 MG/ML
INJECTION, SOLUTION INTRAVENOUS CONTINUOUS
Status: DISCONTINUED | OUTPATIENT
Start: 2025-03-11 | End: 2025-03-12

## 2025-03-11 RX ORDER — NALOXONE HYDROCHLORIDE 0.4 MG/ML
INJECTION, SOLUTION INTRAMUSCULAR; INTRAVENOUS; SUBCUTANEOUS PRN
Status: DISCONTINUED | OUTPATIENT
Start: 2025-03-11 | End: 2025-03-11 | Stop reason: HOSPADM

## 2025-03-11 RX ORDER — ACETAMINOPHEN 325 MG/1
650 TABLET ORAL EVERY 6 HOURS
Status: DISCONTINUED | OUTPATIENT
Start: 2025-03-11 | End: 2025-03-12 | Stop reason: HOSPADM

## 2025-03-11 RX ORDER — LIDOCAINE HYDROCHLORIDE 40 MG/ML
SOLUTION TOPICAL
Status: DISCONTINUED | OUTPATIENT
Start: 2025-03-11 | End: 2025-03-11 | Stop reason: SDUPTHER

## 2025-03-11 RX ORDER — METHOCARBAMOL 100 MG/ML
1000 INJECTION, SOLUTION INTRAMUSCULAR; INTRAVENOUS EVERY 8 HOURS
Status: COMPLETED | OUTPATIENT
Start: 2025-03-11 | End: 2025-03-12

## 2025-03-11 RX ORDER — HYDRALAZINE HYDROCHLORIDE 20 MG/ML
10 INJECTION INTRAMUSCULAR; INTRAVENOUS
Status: DISCONTINUED | OUTPATIENT
Start: 2025-03-11 | End: 2025-03-11 | Stop reason: HOSPADM

## 2025-03-11 RX ORDER — METHOCARBAMOL 750 MG/1
750 TABLET, FILM COATED ORAL EVERY 8 HOURS
Status: DISCONTINUED | OUTPATIENT
Start: 2025-03-11 | End: 2025-03-11

## 2025-03-11 RX ORDER — HYDROMORPHONE HYDROCHLORIDE 1 MG/ML
0.25 INJECTION, SOLUTION INTRAMUSCULAR; INTRAVENOUS; SUBCUTANEOUS
Refills: 0 | Status: DISCONTINUED | OUTPATIENT
Start: 2025-03-11 | End: 2025-03-12

## 2025-03-11 RX ORDER — SODIUM CHLORIDE 0.9 % (FLUSH) 0.9 %
5-40 SYRINGE (ML) INJECTION PRN
Status: DISCONTINUED | OUTPATIENT
Start: 2025-03-11 | End: 2025-03-11 | Stop reason: HOSPADM

## 2025-03-11 RX ORDER — METHOCARBAMOL 100 MG/ML
INJECTION, SOLUTION INTRAMUSCULAR; INTRAVENOUS
Status: DISCONTINUED | OUTPATIENT
Start: 2025-03-11 | End: 2025-03-11 | Stop reason: SDUPTHER

## 2025-03-11 RX ORDER — ONDANSETRON 4 MG/1
4 TABLET, ORALLY DISINTEGRATING ORAL EVERY 8 HOURS PRN
Status: DISCONTINUED | OUTPATIENT
Start: 2025-03-11 | End: 2025-03-12 | Stop reason: HOSPADM

## 2025-03-11 RX ORDER — SODIUM CHLORIDE 0.9 % (FLUSH) 0.9 %
5-40 SYRINGE (ML) INJECTION EVERY 12 HOURS SCHEDULED
Status: DISCONTINUED | OUTPATIENT
Start: 2025-03-11 | End: 2025-03-12 | Stop reason: HOSPADM

## 2025-03-11 RX ORDER — POLYETHYLENE GLYCOL 3350 17 G/17G
17 POWDER, FOR SOLUTION ORAL DAILY
Status: DISCONTINUED | OUTPATIENT
Start: 2025-03-12 | End: 2025-03-12 | Stop reason: HOSPADM

## 2025-03-11 RX ADMIN — DEXMEDETOMIDINE HYDROCHLORIDE 2 MCG: 100 INJECTION, SOLUTION INTRAVENOUS at 11:09

## 2025-03-11 RX ADMIN — SODIUM CHLORIDE, SODIUM LACTATE, POTASSIUM CHLORIDE, AND CALCIUM CHLORIDE: .6; .31; .03; .02 INJECTION, SOLUTION INTRAVENOUS at 08:16

## 2025-03-11 RX ADMIN — OXYCODONE 10 MG: 5 TABLET ORAL at 23:08

## 2025-03-11 RX ADMIN — DEXMEDETOMIDINE HYDROCHLORIDE 4 MCG: 100 INJECTION, SOLUTION INTRAVENOUS at 11:27

## 2025-03-11 RX ADMIN — METHOCARBAMOL 250 MG: 100 INJECTION INTRAMUSCULAR; INTRAVENOUS at 09:50

## 2025-03-11 RX ADMIN — CEFAZOLIN 2000 MG: 2 INJECTION, POWDER, FOR SOLUTION INTRAVENOUS at 22:01

## 2025-03-11 RX ADMIN — METHOCARBAMOL 250 MG: 100 INJECTION INTRAMUSCULAR; INTRAVENOUS at 10:05

## 2025-03-11 RX ADMIN — WATER 2000 MG: 1 INJECTION INTRAMUSCULAR; INTRAVENOUS; SUBCUTANEOUS at 09:18

## 2025-03-11 RX ADMIN — HYDROMORPHONE HYDROCHLORIDE 0.8 MG: 1 INJECTION, SOLUTION INTRAMUSCULAR; INTRAVENOUS; SUBCUTANEOUS at 11:27

## 2025-03-11 RX ADMIN — METHOCARBAMOL 750 MG: 750 TABLET ORAL at 22:03

## 2025-03-11 RX ADMIN — DEXMEDETOMIDINE HYDROCHLORIDE 4 MCG: 100 INJECTION, SOLUTION INTRAVENOUS at 10:56

## 2025-03-11 RX ADMIN — MIDAZOLAM HYDROCHLORIDE 2 MG: 1 INJECTION, SOLUTION INTRAMUSCULAR; INTRAVENOUS at 09:04

## 2025-03-11 RX ADMIN — DEXMEDETOMIDINE HYDROCHLORIDE 4 MCG: 100 INJECTION, SOLUTION INTRAVENOUS at 09:33

## 2025-03-11 RX ADMIN — ONDANSETRON 4 MG: 2 INJECTION INTRAMUSCULAR; INTRAVENOUS at 09:08

## 2025-03-11 RX ADMIN — HYDROMORPHONE HYDROCHLORIDE 0.5 MG: 1 INJECTION, SOLUTION INTRAMUSCULAR; INTRAVENOUS; SUBCUTANEOUS at 12:22

## 2025-03-11 RX ADMIN — LIDOCAINE HYDROCHLORIDE 100 MG: 20 INJECTION, SOLUTION INTRAVENOUS at 09:10

## 2025-03-11 RX ADMIN — PROCHLORPERAZINE EDISYLATE 5 MG: 5 INJECTION INTRAMUSCULAR; INTRAVENOUS at 12:17

## 2025-03-11 RX ADMIN — METHOCARBAMOL 250 MG: 100 INJECTION INTRAMUSCULAR; INTRAVENOUS at 09:55

## 2025-03-11 RX ADMIN — SODIUM CHLORIDE, SODIUM LACTATE, POTASSIUM CHLORIDE, AND CALCIUM CHLORIDE: .6; .31; .03; .02 INJECTION, SOLUTION INTRAVENOUS at 08:23

## 2025-03-11 RX ADMIN — PROPOFOL 250 MG: 10 INJECTION, EMULSION INTRAVENOUS at 09:10

## 2025-03-11 RX ADMIN — SODIUM CHLORIDE, SODIUM LACTATE, POTASSIUM CHLORIDE, AND CALCIUM CHLORIDE: .6; .31; .03; .02 INJECTION, SOLUTION INTRAVENOUS at 09:06

## 2025-03-11 RX ADMIN — ONDANSETRON 4 MG: 2 INJECTION INTRAMUSCULAR; INTRAVENOUS at 11:07

## 2025-03-11 RX ADMIN — METHOCARBAMOL 250 MG: 100 INJECTION INTRAMUSCULAR; INTRAVENOUS at 10:00

## 2025-03-11 RX ADMIN — SUGAMMADEX 400 MG: 100 INJECTION, SOLUTION INTRAVENOUS at 11:20

## 2025-03-11 RX ADMIN — FENTANYL CITRATE 25 MCG: 0.05 INJECTION, SOLUTION INTRAMUSCULAR; INTRAVENOUS at 17:12

## 2025-03-11 RX ADMIN — GABAPENTIN 300 MG: 300 CAPSULE ORAL at 22:02

## 2025-03-11 RX ADMIN — HYDROMORPHONE HYDROCHLORIDE 0.5 MG: 1 INJECTION, SOLUTION INTRAMUSCULAR; INTRAVENOUS; SUBCUTANEOUS at 12:11

## 2025-03-11 RX ADMIN — DEXAMETHASONE SODIUM PHOSPHATE 8 MG: 4 INJECTION INTRA-ARTICULAR; INTRALESIONAL; INTRAMUSCULAR; INTRAVENOUS; SOFT TISSUE at 09:08

## 2025-03-11 RX ADMIN — ROCURONIUM BROMIDE 100 MG: 10 INJECTION, SOLUTION INTRAVENOUS at 09:11

## 2025-03-11 RX ADMIN — HYDROMORPHONE HYDROCHLORIDE 1 MG: 1 INJECTION, SOLUTION INTRAMUSCULAR; INTRAVENOUS; SUBCUTANEOUS at 09:04

## 2025-03-11 RX ADMIN — HYDROMORPHONE HYDROCHLORIDE 0.2 MG: 1 INJECTION, SOLUTION INTRAMUSCULAR; INTRAVENOUS; SUBCUTANEOUS at 11:12

## 2025-03-11 RX ADMIN — PHENYLEPHRINE HYDROCHLORIDE 100 MCG: 10 INJECTION, SOLUTION INTRAVENOUS at 09:55

## 2025-03-11 RX ADMIN — SODIUM CHLORIDE, SODIUM LACTATE, POTASSIUM CHLORIDE, CALCIUM CHLORIDE: 600; 310; 30; 20 INJECTION, SOLUTION INTRAVENOUS at 09:06

## 2025-03-11 RX ADMIN — SODIUM CHLORIDE, PRESERVATIVE FREE 10 ML: 5 INJECTION INTRAVENOUS at 22:08

## 2025-03-11 RX ADMIN — ESMOLOL HYDROCHLORIDE 20 MG: 10 INJECTION, SOLUTION INTRAVENOUS at 09:37

## 2025-03-11 RX ADMIN — ACETAMINOPHEN 650 MG: 325 TABLET, FILM COATED ORAL at 22:02

## 2025-03-11 RX ADMIN — FENTANYL CITRATE 25 MCG: 0.05 INJECTION, SOLUTION INTRAMUSCULAR; INTRAVENOUS at 17:44

## 2025-03-11 RX ADMIN — LIDOCAINE HYDROCHLORIDE 4 ML: 40 SOLUTION TOPICAL at 09:13

## 2025-03-11 RX ADMIN — DEXMEDETOMIDINE HYDROCHLORIDE 4 MCG: 100 INJECTION, SOLUTION INTRAVENOUS at 11:22

## 2025-03-11 ASSESSMENT — PAIN - FUNCTIONAL ASSESSMENT
PAIN_FUNCTIONAL_ASSESSMENT: 0-10
PAIN_FUNCTIONAL_ASSESSMENT: PREVENTS OR INTERFERES SOME ACTIVE ACTIVITIES AND ADLS
PAIN_FUNCTIONAL_ASSESSMENT: ACTIVITIES ARE NOT PREVENTED

## 2025-03-11 ASSESSMENT — PAIN SCALES - GENERAL
PAINLEVEL_OUTOF10: 6
PAINLEVEL_OUTOF10: 9
PAINLEVEL_OUTOF10: 7
PAINLEVEL_OUTOF10: 5
PAINLEVEL_OUTOF10: 7
PAINLEVEL_OUTOF10: 8
PAINLEVEL_OUTOF10: 7
PAINLEVEL_OUTOF10: 6
PAINLEVEL_OUTOF10: 8

## 2025-03-11 ASSESSMENT — PAIN DESCRIPTION - PAIN TYPE
TYPE: SURGICAL PAIN
TYPE: SURGICAL PAIN;CHRONIC PAIN
TYPE: SURGICAL PAIN
TYPE: SURGICAL PAIN;CHRONIC PAIN
TYPE: SURGICAL PAIN;CHRONIC PAIN

## 2025-03-11 ASSESSMENT — PAIN DESCRIPTION - ORIENTATION
ORIENTATION: LOWER;MID
ORIENTATION: LOWER
ORIENTATION: LOWER;MID

## 2025-03-11 ASSESSMENT — PAIN DESCRIPTION - LOCATION
LOCATION: BACK

## 2025-03-11 ASSESSMENT — PAIN DESCRIPTION - DESCRIPTORS
DESCRIPTORS: ACHING
DESCRIPTORS: DISCOMFORT
DESCRIPTORS: ACHING

## 2025-03-11 ASSESSMENT — PAIN DESCRIPTION - ONSET: ONSET: PROGRESSIVE

## 2025-03-11 ASSESSMENT — PAIN DESCRIPTION - FREQUENCY: FREQUENCY: CONTINUOUS

## 2025-03-11 NOTE — OP NOTE
dressing was then applied. The patient was extubated in the operating room and transferred to the recovery room in stable condition. There were no complications. All needle, instrument, and sponge counts were correct.       ESTIMATED BLOOD LOSS: 20 mL     COMPLICATIONS: No complications apparent.     DRAINS: Medium Hemovac drain     DISPOSITION: The patient was transferred to the PACU in stable condition.  Dictated by: Jami Ramírez MD

## 2025-03-11 NOTE — DISCHARGE INSTRUCTIONS
[facility] Program Survey  The [facility] values your feedback related to your recent hospital visit and admission. We strive to improve our program to promote better outcomes and recoveries for all our patients.  The survey code below consists of a few questions that are related to your stay and around your Spine diagnosis, treatment, and recovery. The estimated length of time needed to complete this survey is 4 minutes or less. Thank you for completing this survey!

## 2025-03-11 NOTE — PROGRESS NOTES
Significant other updated per phone, not in the hospital. Patient talked with significant other on the phone.

## 2025-03-11 NOTE — ANESTHESIA POSTPROCEDURE EVALUATION
Department of Anesthesiology  Postprocedure Note    Patient: Blanche Hensley  MRN: [id number]  YOB: 1990  Date of evaluation: 3/11/2025    Procedure Summary       Date: 03/11/25 Room / Location: [place] [address] / [facility]    Anesthesia Start: 0906 Anesthesia Stop: 1133    Procedure: BILATERAL LUMBAR 4-LUMBAR 5 DECOMPRESSION AND DISCECTOMY (Bilateral) Diagnosis:       Lumbar disc herniation with radiculopathy      (Lumbar disc herniation with radiculopathy [M51.16])    Surgeons: Jami Ramírez MD Responsible Provider: Heladio Rodriguez MD    Anesthesia Type: general ASA Status: 3            Anesthesia Type: No value filed.    Kelvin Phase I: Kelvin Score: 8    Kelvin Phase II:      Anesthesia Post Evaluation    Patient location during evaluation: PACU  Patient participation: complete - patient participated  Level of consciousness: awake  Airway patency: patent  Nausea & Vomiting: no nausea and no vomiting  Cardiovascular status: blood pressure returned to baseline and hemodynamically stable  Respiratory status: acceptable  Hydration status: euvolemic  Multimodal analgesia pain management approach  Pain management: adequate    No notable events documented.

## 2025-03-11 NOTE — PROGRESS NOTES
Patient meets criteria to transfer to floor per Kelvin Score. There are no available beds at this time.  Placed in CLINICAL DISCHARGE.      Vitals changed to every hour and Head - to - toe assessments changed to every 2 hours.  Family updated.    In: 1000 [I.V.:1000]  Out: 50     Vitals:    03/11/25 1330   BP: 100/76   Pulse: 89   Resp: 12   Temp: 98.6 °F (37 °C)   SpO2: 97%

## 2025-03-11 NOTE — PROGRESS NOTES
Spiritual Health History and Assessment/Progress Note  [facility]    (P) Initial Encounter, Pre-Op,  ,  ,      Name: Blanche Hensley MRN: [id number]    Age: 34 y.o.     Sex: female   Language: English   Alevism: None   <principal problem not specified>     Date: 3/11/2025            Total Time Calculated: (P) 11 min              Spiritual Assessment began in [facility] GENERAL SURGERY        Referral/Consult From: (P) Rounding   Encounter Overview/Reason: (P) Initial Encounter, Pre-Op  Service Provided For: (P) Patient    Cindy, Belief, Meaning:   Patient identifies as spiritual and has beliefs or practices that help with coping during difficult times  Family/Friends Other: friend in the waiting area.      Importance and Influence:  Patient has spiritual/personal beliefs that influence decisions regarding their health    Community:  Patient feels well-supported. Support system includes: Other: Pt is supported by her girlfriend    Assessment and Plan of Care:   Patient Interventions include: Facilitated expression of thoughts and feelings, Explored spiritual coping/struggle/distress, Engaged in theological reflection, Affirmed coping skills/support systems, Facilitated life review and/ or legacy, and Other: Prayed with pt.    Patient Plan of Care: No spiritual needs identified for follow-up    Electronically signed by Chaplain Orion on 3/11/2025 at 9:41 AM

## 2025-03-11 NOTE — ANESTHESIA PRE PROCEDURE
Department of Anesthesiology  Preprocedure Note       Name:  Blanche Hensley   Age:  34 y.o.  :  1990                                          MRN:  [id number]         Date:  3/11/2025      Surgeon: Surgeon(s):  Jami Ramírez MD    Procedure: Procedure(s):  BILATERAL LUMBAR 4-LUMBAR 5 DECOMPRESSION AND DISCECTOMY    Medications prior to admission:   Prior to Admission medications    Medication Sig Start Date End Date Taking? Authorizing Provider   tiZANidine (ZANAFLEX) 2 MG tablet TAKE 1 TABLET BY MOUTH 3 TIMES DAILY AS NEEDED (FOR BACK PAIN) 25  Yes Farzaneh Blackburn MD   gabapentin (NEURONTIN) 300 MG capsule Take 1 capsule by mouth 4 times daily for 240 doses. 1/28/25 3/29/25 Yes Maciel Gates MD   acetaminophen (TYLENOL) 500 MG CAPS capsule Take 1 capsule by mouth every 6 hours as needed for Pain   Yes Provider, MD Grant   amLODIPine (NORVASC) 10 MG tablet Take 1 tablet by mouth daily 10/8/24  Yes Farzaneh Blackburn MD       Current medications:    Current Facility-Administered Medications   Medication Dose Route Frequency Provider Last Rate Last Admin   • ceFAZolin (ANCEF) 2,000 mg in sterile water 20 mL IV syringe  2,000 mg IntraVENous Once Jmai Ramírez MD       • lactated ringers infusion   IntraVENous Continuous Zoey Mtz,  100 mL/hr at 25 0823 New Bag at 25 0823   • ceFAZolin (ANCEF) 1,000 mg in sod chloride IRR soln 0.9 % 1,000 mL    PRN Jami Ramírez MD   Given at 25 0822       Allergies:  No Known Allergies    Problem List:    Patient Active Problem List   Diagnosis Code   • Primary hypertension I10   • Alcohol consumption heavy Z78.9   • Tobacco dependence in remission F17.201   • Lumbar disc herniation with radiculopathy M51.16   • Severe obesity (BMI 35.0-39.9) with comorbidity (HCC) E66.01   • At risk for sleep apnea Z91.89   • Preop exam for internal medicine Z01.818       Past Medical History:        Diagnosis Date   •

## 2025-03-11 NOTE — INTERVAL H&P NOTE
Update History & Physical    The patient's History and Physical of February 25, 2025 was reviewed with the patient and I examined the patient. There was no change. The surgical site was confirmed by the patient and me.     Plan: The risks, benefits, expected outcome, and alternative to the recommended procedure have been discussed with the patient. Patient understands and wants to proceed with the procedure.     Electronically signed by MITCHELL MENDEZ MD on 3/11/2025 at 9:01 AM

## 2025-03-11 NOTE — PROGRESS NOTES
From OR drowsy, moaning and restless, in pain but unable to rate pain, CRNA gave Dilaudid, incision with surgical glue CDI, with hemovac in place and compressed, VSS.    Report from CRNA and OR RN.    S/P BILATERAL LUMBAR 4-LUMBAR 5 DECOMPRESSION AND DISCECTOMY - Bilateral

## 2025-03-11 NOTE — PROGRESS NOTES
Called Dr. Ramírez that patient's hemovac does not stay compressed anymore and she told me to call ROMEO Mendes, whom I called and she stated that will come see the patient.

## 2025-03-11 NOTE — FLOWSHEET NOTE
Received pt for surgery with Dr Ramírez for Bilateral Lumbar 4 Lumbar 5 decompression and discectomy. Pt states she injured herself by over use at work. States back feels some better with the medication she is on, but still has times when her right leg feels weak and has tingling and pain shooting down leg.   Admission assessment WNL at this time. Pt girlfriend at bedside and is supportive.

## 2025-03-12 VITALS
OXYGEN SATURATION: 97 % | HEIGHT: 67 IN | BODY MASS INDEX: 37.61 KG/M2 | SYSTOLIC BLOOD PRESSURE: 133 MMHG | HEART RATE: 64 BPM | RESPIRATION RATE: 16 BRPM | TEMPERATURE: 98.4 F | DIASTOLIC BLOOD PRESSURE: 78 MMHG | WEIGHT: 239.6 LBS

## 2025-03-12 PROCEDURE — 96372 THER/PROPH/DIAG INJ SC/IM: CPT

## 2025-03-12 PROCEDURE — 6370000000 HC RX 637 (ALT 250 FOR IP): Performed by: STUDENT IN AN ORGANIZED HEALTH CARE EDUCATION/TRAINING PROGRAM

## 2025-03-12 PROCEDURE — 6360000002 HC RX W HCPCS: Performed by: INTERNAL MEDICINE

## 2025-03-12 PROCEDURE — 97535 SELF CARE MNGMENT TRAINING: CPT

## 2025-03-12 PROCEDURE — 2500000003 HC RX 250 WO HCPCS: Performed by: STUDENT IN AN ORGANIZED HEALTH CARE EDUCATION/TRAINING PROGRAM

## 2025-03-12 PROCEDURE — 96374 THER/PROPH/DIAG INJ IV PUSH: CPT

## 2025-03-12 PROCEDURE — 97165 OT EVAL LOW COMPLEX 30 MIN: CPT

## 2025-03-12 PROCEDURE — 97116 GAIT TRAINING THERAPY: CPT

## 2025-03-12 PROCEDURE — 97161 PT EVAL LOW COMPLEX 20 MIN: CPT

## 2025-03-12 PROCEDURE — 6360000002 HC RX W HCPCS: Performed by: STUDENT IN AN ORGANIZED HEALTH CARE EDUCATION/TRAINING PROGRAM

## 2025-03-12 PROCEDURE — G0378 HOSPITAL OBSERVATION PER HR: HCPCS

## 2025-03-12 RX ORDER — POLYETHYLENE GLYCOL 3350 17 G/17G
17 POWDER, FOR SOLUTION ORAL DAILY PRN
COMMUNITY
Start: 2025-03-12 | End: 2025-04-11

## 2025-03-12 RX ORDER — METHOCARBAMOL 750 MG/1
750 TABLET, FILM COATED ORAL EVERY 8 HOURS PRN
Qty: 30 TABLET | Refills: 0 | Status: SHIPPED | OUTPATIENT
Start: 2025-03-12 | End: 2025-03-22

## 2025-03-12 RX ORDER — OXYCODONE HYDROCHLORIDE 5 MG/1
5-10 TABLET ORAL EVERY 6 HOURS PRN
Qty: 42 TABLET | Refills: 0 | Status: SHIPPED | OUTPATIENT
Start: 2025-03-12 | End: 2025-03-19

## 2025-03-12 RX ADMIN — CEFAZOLIN 2000 MG: 2 INJECTION, POWDER, FOR SOLUTION INTRAVENOUS at 06:24

## 2025-03-12 RX ADMIN — METHOCARBAMOL 750 MG: 750 TABLET ORAL at 06:24

## 2025-03-12 RX ADMIN — POLYETHYLENE GLYCOL 3350 17 G: 17 POWDER, FOR SOLUTION ORAL at 08:41

## 2025-03-12 RX ADMIN — OXYCODONE 10 MG: 5 TABLET ORAL at 08:40

## 2025-03-12 RX ADMIN — OXYCODONE 10 MG: 5 TABLET ORAL at 04:22

## 2025-03-12 RX ADMIN — ACETAMINOPHEN 650 MG: 325 TABLET, FILM COATED ORAL at 04:21

## 2025-03-12 RX ADMIN — OXYCODONE 10 MG: 5 TABLET ORAL at 14:41

## 2025-03-12 RX ADMIN — HYDROMORPHONE HYDROCHLORIDE 0.5 MG: 1 INJECTION, SOLUTION INTRAMUSCULAR; INTRAVENOUS; SUBCUTANEOUS at 00:29

## 2025-03-12 RX ADMIN — GABAPENTIN 300 MG: 300 CAPSULE ORAL at 13:21

## 2025-03-12 RX ADMIN — SODIUM CHLORIDE, PRESERVATIVE FREE 10 ML: 5 INJECTION INTRAVENOUS at 09:08

## 2025-03-12 RX ADMIN — ACETAMINOPHEN 650 MG: 325 TABLET, FILM COATED ORAL at 08:40

## 2025-03-12 RX ADMIN — METHOCARBAMOL 750 MG: 750 TABLET ORAL at 13:21

## 2025-03-12 RX ADMIN — AMLODIPINE BESYLATE 10 MG: 10 TABLET ORAL at 08:40

## 2025-03-12 RX ADMIN — ENOXAPARIN SODIUM 30 MG: 100 INJECTION SUBCUTANEOUS at 08:41

## 2025-03-12 RX ADMIN — GABAPENTIN 300 MG: 300 CAPSULE ORAL at 08:40

## 2025-03-12 ASSESSMENT — PAIN DESCRIPTION - ORIENTATION
ORIENTATION: LOWER

## 2025-03-12 ASSESSMENT — PAIN SCALES - GENERAL
PAINLEVEL_OUTOF10: 6
PAINLEVEL_OUTOF10: 8
PAINLEVEL_OUTOF10: 7
PAINLEVEL_OUTOF10: 3
PAINLEVEL_OUTOF10: 7
PAINLEVEL_OUTOF10: 0

## 2025-03-12 ASSESSMENT — PAIN DESCRIPTION - ONSET
ONSET: PROGRESSIVE
ONSET: ON-GOING
ONSET: PROGRESSIVE

## 2025-03-12 ASSESSMENT — PAIN DESCRIPTION - FREQUENCY
FREQUENCY: CONTINUOUS

## 2025-03-12 ASSESSMENT — PAIN DESCRIPTION - PAIN TYPE
TYPE: SURGICAL PAIN

## 2025-03-12 ASSESSMENT — PAIN DESCRIPTION - LOCATION
LOCATION: BACK

## 2025-03-12 ASSESSMENT — PAIN DESCRIPTION - DESCRIPTORS
DESCRIPTORS: THROBBING
DESCRIPTORS: ACHING
DESCRIPTORS: ACHING

## 2025-03-12 ASSESSMENT — PAIN - FUNCTIONAL ASSESSMENT
PAIN_FUNCTIONAL_ASSESSMENT: ACTIVITIES ARE NOT PREVENTED
PAIN_FUNCTIONAL_ASSESSMENT: PREVENTS OR INTERFERES SOME ACTIVE ACTIVITIES AND ADLS
PAIN_FUNCTIONAL_ASSESSMENT: PREVENTS OR INTERFERES SOME ACTIVE ACTIVITIES AND ADLS

## 2025-03-12 NOTE — PLAN OF CARE
Problem: Discharge Planning  Goal: Discharge to home or other facility with appropriate resources  Outcome: Progressing     Problem: Pain  Goal: Verbalizes/displays adequate comfort level or baseline comfort level  Outcome: Progressing   Pt endorsing pain to back. Being treated with PRN pain medication, rest, and frequent repositioning with pillow support for comfort and pressure relief. Pt reports some relief from pain with above interventions.    Problem: Safety - Adult  Goal: Free from fall injury  Outcome: Progressing     Problem: ABCDS Injury Assessment  Goal: Absence of physical injury  Outcome: Progressing     Problem: Neurosensory - Adult  Goal: Achieves stable or improved neurological status  Outcome: Progressing  Goal: Achieves maximal functionality and self care  Outcome: Progressing     Problem: Skin/Tissue Integrity - Adult  Goal: Incisions, wounds, or drain sites healing without S/S of infection  Outcome: Progressing   Surgical site without s/s of infection, site CD&I    Problem: Musculoskeletal - Adult  Goal: Return mobility to safest level of function  Outcome: Progressing  Goal: Maintain proper alignment of affected body part  Outcome: Progressing  Goal: Return ADL status to a safe level of function  Outcome: Progressing     Problem: Infection - Adult  Goal: Absence of infection during hospitalization  Outcome: Progressing  Goal: Absence of fever/infection during anticipated neutropenic period  Outcome: Progressing   No fevers post-op

## 2025-03-12 NOTE — PROGRESS NOTES
Occupational Therapy  Facility/Department: [facility] ORTHO/NEURO  Occupational Therapy Initial Assessment/Treatment/Discharge    Name: Blanche Hensley  : 1990  MRN: [id number]  Date of Service: 3/12/2025    Discharge Recommendations:  Home with assist PRN  OT Equipment Recommendations  Equipment Needed: No       Patient Diagnosis(es): There were no encounter diagnoses.  Past Medical History:  has a past medical history of Alcohol use, Conductive hearing loss of left ear, Hypertension, Lumbar herniated disc, Primary pulmonary HTN (HCC), and Tobacco dependence in remission.  Past Surgical History:  has a past surgical history that includes Pain management procedure (Left, 10/29/2024); Dental surgery; and Lumbar spine surgery (Bilateral, 3/11/2025).    Treatment Diagnosis: Decreased ADLs, functional transfers/mobility      Assessment  Performance deficits / Impairments: Decreased functional mobility ;Decreased ADL status;Decreased endurance  Assessment: Pt presenting POD1 s/p BILATERAL L4-L5 DECOMPRESSION AND DISCECTOMY. PTA, pt resided from home w/ significant other and reported IND w/ ADLs and functional mobility. Today, pt requiring SBA for functional transfers and mobility using RW and SBA for toileting and LB dressing. Education provided on spinal precautions and pt denies concerns upon d/c home. Anticipate continued progress w/ increased OOB opprtunities w/ nurisng staff A. No further skilled OT needs, no DME needs. Recommend return home w/ A PRN. Will sign off.  Treatment Diagnosis: Decreased ADLs, functional transfers/mobility  Decision Making: Low Complexity  REQUIRES OT FOLLOW-UP: No  Activity Tolerance  Activity Tolerance: Patient Tolerated treatment well     Plan  Occupational Therapy Plan  Times Per Week: D/c acute OT    Restrictions  Position Activity Restriction  Spinal Precautions: No Bending, No Lifting, No Twisting  Other Position/Activity Restrictions: amb pt    Subjective  General  Chart

## 2025-03-12 NOTE — DISCHARGE SUMMARY
Discharge Summary    Date of Admission: 3/11/2025  7:09 AM  Date of Discharge: 3/12/24  Admission Diagnosis: Lumbar disc herniation with radiculopathy [M51.16]  Discharge Diagnosis: Same   Condition on Discharge: excellent  Attending for Admission: Jami Ramírez MD  Procedures: Procedure(s) (LRB):  BILATERAL LUMBAR 4-LUMBAR 5 DECOMPRESSION AND DISCECTOMY (Bilateral)  Consults: IP CONSULT TO HOSPITALIST    Reason for Admission:  Blanche Hensley is a 34 y.o. female patient who was admitted to the hospital for complaints of back and right leg pain, although she had prior left sided leg pain. Symptoms failed to respond to conservative intervention. An MRI scan was performed and this showed evidence of multilevel lumbar and degenerative disc disease; however, the most significant finding was a severe  subarticular stenosis at L4-5 bilaterally with associated radiculopathy on the right (left prior). Having failed conservative management and experiencing persistent symptoms, the patient elected to proceed ahead with the surgical option of L4-5  laminectomy, decompression and foraminotomies as well as discectomy at that level. She underwent the procedure listed above on 3/11/25.     Hospital Course:  After surgery, Her pre-operative leg pain was improving. She complained of incisional pain. The pain was well-controlled on oral medications.The incision was clean, dry and intact. There was no erythema or edema around the surgical site. Prior to discharge She was eating well, urinating and ambulating with a steady gait and scored well with therapy.    Discharge Vitals/Labs:  /78   Pulse 64   Temp 98.4 °F (36.9 °C) (Oral)   Resp 16   Ht 1.702 m (5' 7\")   Wt 108.7 kg (239 lb 9.6 oz)   LMP 03/02/2025 Comment: negative UPT 03/11/2025  SpO2 97%   BMI 37.53 kg/m²   CBC with Differential:    Lab Results   Component Value Date/Time    WBC 4.3 02/25/2025 02:22 PM    RBC 4.35 02/25/2025 02:22 PM    HGB

## 2025-03-12 NOTE — PROGRESS NOTES
Physical Therapy  Facility/Department: [facility] ORTHO/NEURO  Physical Therapy Initial Assessment/Discharge Summary     Name: Blanche Hensley  : 1990  MRN: [id number]  Date of Service: 3/12/2025    Discharge Recommendations:  Home with assist PRN   PT Equipment Recommendations  Equipment Needed: No      Patient Diagnosis(es): There were no encounter diagnoses.  Past Medical History:  has a past medical history of Alcohol use, Conductive hearing loss of left ear, Hypertension, Lumbar herniated disc, Primary pulmonary HTN (HCC), and Tobacco dependence in remission.  Past Surgical History:  has a past surgical history that includes Pain management procedure (Left, 10/29/2024); Dental surgery; and Lumbar spine surgery (Bilateral, 3/11/2025).    Assessment  Assessment: Pt is a 33 yo female from home with spouse and IND at baseline. Pt admitted for BILATERAL LUMBAR 4-LUMBAR 5 DECOMPRESSION AND DISCECTOMY. On eval pt demonstrating no acute deficits and is performing all mobility close to baseline function. Transfers and ambulation performed with supervision at  and steps performed with SBA and one rail. Pt expressing no concerns for return home at [place] and demonstrates no further needs for acute PT. Recommend PRN A at dc. PT to sign off.  Therapy Prognosis: Good  Decision Making: Low Complexity  No Skilled PT: Safe to return home  Requires PT Follow-Up: No  Activity Tolerance  Activity Tolerance: Patient tolerated evaluation without incident    Plan  Physical Therapy Plan  General Plan: Discharge with evaluation only  Safety Devices  Type of Devices: Call light within reach, Gait belt, Left in chair, Nurse notified    Restrictions  Position Activity Restriction  Spinal Precautions: No Bending, No Lifting, No Twisting  Other Position/Activity Restrictions: amb pt     Subjective  General  Chart Reviewed: Yes  Additional Pertinent Hx: pt is a 33 yo female s/p BILATERAL LUMBAR 4-LUMBAR 5 DECOMPRESSION AND

## 2025-03-12 NOTE — PROGRESS NOTES
NEUROSURGERY POST-OP PROGRESS NOTE    Patient Name: Blanche Hensley YOB: 1990   Sex: Female Age: 34 yrs     Medical Record Number: [id number] Acct Number: [id number]   Room Number: 5504/5504-01 Hospital Day: Hospital Day: 2     Interval History:  Post-operative Day# 1 s/p Procedure(s) (LRB):  BILATERAL LUMBAR 4-LUMBAR 5 DECOMPRESSION AND DISCECTOMY (Bilateral)    Subjective: No acute events overnight. Patient sitting up in chair, reports incisional pain is controlled. Her leg pain is improved. She feels ready to discharge.    Objective:    VITAL SIGNS   /78   Pulse 64   Temp 98.4 °F (36.9 °C) (Oral)   Resp 16   Ht 1.702 m (5' 7\")   Wt 108.7 kg (239 lb 9.6 oz)   LMP 03/02/2025 Comment: negative UPT 03/11/2025  SpO2 97%   BMI 37.53 kg/m²    Height Height: 170.2 cm (5' 7\")   Weight Weight - Scale: 108.7 kg (239 lb 9.6 oz)        Allergies No Known Allergies   NPO Status ADULT DIET; Regular   Isolation No active isolations     LABS   Basic Metabolic Profile No results for input(s): \"NA\", \"POTASSIUM\", \"CL\", \"CO2\", \"BUN\", \"CREATININE\", \"GLUCOSE\", \"PHOS\", \"MG\" in the last 72 hours.    Invalid input(s): \"CA\", \"ALB\"   Complete Blood Count No results for input(s): \"WBC\", \"RBC\" in the last 72 hours.    Invalid input(s): \"HEMOGLOBIN\", \"HEMATOCRIT\"   Coagulation Studies No results for input(s): \"INR\" in the last 72 hours.    Invalid input(s): \"PLATELETS\", \"PROA\", \"PT\", \"PTTA\", \"PTT\"     MEDICATIONS   Inpatient Medications     acetaminophen, 1,000 mg, Oral, Once    amLODIPine, 10 mg, Oral, Daily    gabapentin, 300 mg, Oral, 4x Daily    sodium chloride flush, 5-40 mL, IntraVENous, 2 times per day    acetaminophen, 650 mg, Oral, Q6H    polyethylene glycol, 17 g, Oral, Daily    enoxaparin, 30 mg, SubCUTAneous, BID    methocarbamol, 1,000 mg,

## 2025-03-12 NOTE — PROGRESS NOTES
PACU Transfer Note    Vitals:    03/11/25 2030   BP: 126/78   Pulse: 97   Resp: 12   Temp: 98.9 °F (37.2 °C)   SpO2: 98%       No intake/output data recorded.    Pain assessment:    Pain Level: 5 Satisfied, pain only when moving, none at rest.    Report given to Receiving unit KAMALA Curry.    Transported by seth Camacho transporter.    3/11/2025 8:51 PM

## 2025-03-12 NOTE — PROGRESS NOTES
4 Eyes Skin Assessment     NAME:  Blanche Hensley  YOB: 1990  MEDICAL RECORD NUMBER:  [id number]    The patient is being assessed for  Admission    I agree that at least one RN has performed a thorough Head to Toe Skin Assessment on the patient. ALL assessment sites listed below have been assessed.      Areas assessed by both nurses:    Head, Face, Ears, Shoulders, Back, Chest, Arms, Elbows, Hands, Sacrum. Buttock, Coccyx, Ischium, Legs. Feet and Heels, and Under Medical Devices         Does the Patient have a Wound? Yes wound(s) were present on assessment. LDA wound assessment was Initiated and completed by RN     Surgical incision    Eliseo Prevention initiated by RN: No  Wound Care Orders initiated by RN: No    Pressure Injury (Stage 3,4, Unstageable, DTI, NWPT, and Complex wounds) if present, place Wound referral order by RN under : No    New Ostomies, if present place, Ostomy referral order under : No     Nurse 1 eSignature: Electronically signed by Antoinette Sanches RN on 3/12/25 at 4:10 AM EDT    **SHARE this note so that the co-signing nurse can place an eSignature**    Nurse 2 eSignature: Electronically signed by Nai Vásquez RN on 3/12/25 at 4:11 AM EDT

## 2025-03-12 NOTE — PROGRESS NOTES
VSS, afebrile. Alert and oriented. PRN pain medication given with relief noted. Hemovac without any output. Rubber band in place as device unable to hold compression- was placed by NP prior to arrival to floor per PACU RN. Pt ambulated well with asst x1 GB/walker in room/hallways. No acute events overnight. All fall & safety precautions in place. Call light within reach. Continue current plan of care.

## 2025-03-12 NOTE — CONSULTS
V2.0  [facility] [place]      Name:  Blanche Hensley /Age/Sex: 1990  (34 y.o. female)   MRN & CSN:  [id number] & [id number] Encounter Date/Time: 3/11/2025 11:17 PM EDT   Location:  [facility]5504-01 PCP: Farzaneh Blackburn MD     Attending:Jami Ramírez MD  Consulting Provider: ETELVINA IBARRA MD      Hospital Day: 1    Assessment and Recommendations   Blanche Hensley is a 34 y.o. female with past medical history of essential hypertension, remote tobacco abuse, chronic low back pain status post bilateral L4-5 laminectomy, decompression and foraminotomies, L4-5 discectomy and microdissection using the operating room microscope    Status post bilateral L4-L5 laminectomy and discectomy    Pain control with IV Dilaudid  Postoperative DVT prophylaxis per surgery  Postoperative order set and antibiotics  Resume Neurontin 300 mg 4 times daily  Lovenox 30 mg SQ twice daily  Robaxin 1 g IV every 8 for 3 doses  IV fluids for now  Regular diet    #2 essential hypertension-chronic condition    Norvasc 10 mg daily    Hospital Problems           Last Modified POA    * (Principal) Lumbar radiculopathy 3/11/2025 Yes     Diet ADULT DIET; Regular   DVT Prophylaxis [x] Lovenox, []  Heparin, [] SCDs, [] Ambulation,  [] Eliquis, [] Xarelto   Code Status Full Code   Surrogate Decision Maker/ POA       Personally reviewed Lab Studies and Imaging     History From:    History obtained from the patient.     History of Present Illness:      Chief Complaint: low back pain    Blanche Hensley is a 34 y.o. female who postoperative day 1 L4-L5 laminectomy and discectomy  We have been consulted for medical management      Review of Systems:      Pertinent positives and negatives discussed in HPI    Objective:     Intake/Output Summary (Last 24 hours) at 3/11/2025 2317  Last data filed at 3/11/2025 2030  Gross per 24 hour   Intake 1900 ml   Output 50 ml   Net 1850 ml      Vitals:   Vitals:    25

## 2025-03-12 NOTE — PROGRESS NOTES
[facility] Medicine Progress Note  V 1.6      Date of Admission: 3/11/2025    Hospital Day: 2      Chief Admission Complaint: Back pain    Subjective: Doing well sitting up in the chair participating with therapy pain is under control    Presenting Admission History:       Blanche Hensley is a 34 y.o. female with past medical history of essential hypertension, remote tobacco abuse, chronic low back pain status post bilateral L4-5 laminectomy, decompression and foraminotomies, L4-5 discectomy and microdissection     Assessment/Plan:      Current Principal Problem:  Lumbar radiculopathy    Status post bilateral L4-L5 laminectomy and discectomy     Pain control with po oxy;  IV Dilaudid used last night  Postoperative DVT prophylaxis per surgery  Postoperative order set and antibiotics  Resume Neurontin 300 mg 4 times daily  Lovenox 30 mg SQ twice daily  Robaxin 1 g IV every 8 for 3 doses  Dc fluids   Regular diet     #2 essential hypertension-chronic condition     Norvasc 10 mg daily    Ongoing threat to life and/or bodily function without ongoing treatment due to:      Consults:      IP CONSULT TO HOSPITALIST        --------------------------------------------------      Medications:        Infusion Medications    sodium chloride      sodium chloride       Scheduled Medications    acetaminophen  1,000 mg Oral Once    amLODIPine  10 mg Oral Daily    gabapentin  300 mg Oral 4x Daily    sodium chloride flush  5-40 mL IntraVENous 2 times per day    acetaminophen  650 mg Oral Q6H    polyethylene glycol  17 g Oral Daily    enoxaparin  30 mg SubCUTAneous BID    methocarbamol  1,000 mg IntraVENous Q8H    Or    methocarbamol  750 mg Oral Q8H     PRN Meds: sodium chloride flush, sodium chloride, ondansetron **OR** ondansetron, oxyCODONE **OR** oxyCODONE, HYDROmorphone **OR** HYDROmorphone      Physical Exam Performed:      General appearance:  No apparent distress  Respiratory:  Normal respiratory effort.   Cardiovascular:

## 2025-03-12 NOTE — PROGRESS NOTES
2 PIV removed. Pt educated on AVS, pt fully understood education, pt questions were answered. Pt partner packed belongings, and will transport home.

## 2025-03-13 ENCOUNTER — CARE COORDINATION (OUTPATIENT)
Dept: CASE MANAGEMENT | Age: 35
End: 2025-03-13

## 2025-03-13 NOTE — CARE COORDINATION
Care Transitions Note    Initial Call - Call within 2 business days of discharge: Yes    Attempted to reach patient for transitions of care follow up. Unable to reach patient.    Outreach Attempts:   1ST CTC attempt to reach Pt regarding recent hospital discharge.  CTC left voice recording with call back number requesting a call back. Will attempt to reach patient again.    Patient: Blanche Hensley    Patient : 1990   MRN: [id number]     Reason for Admission: BILATERAL LUMBAR 4-LUMBAR 5 DECOMPRESSION AND DISCECTOMY Bilateral  Discharge Date: 3/12/25    RURS: Readmission Risk Score: 4.8    Last Discharge Facility       Date Complaint Diagnosis Description Type Department Provider    3/11/25  S/P lumbar fusion Admission (Discharged) [id number] Dseiree, Jami Garner MD            Was this an external facility discharge? No    Follow Up Appointment:   Patient does not have a follow up appointment scheduled at time of call.  Unable to reach patient      Plan for follow-up on next business day.      Thank You,    Hiral León RN  Care Transition Coordinator  Contact Number:[phone]

## 2025-03-14 ENCOUNTER — CARE COORDINATION (OUTPATIENT)
Dept: CASE MANAGEMENT | Age: 35
End: 2025-03-14

## 2025-03-14 NOTE — CARE COORDINATION
Care Transitions Note    Initial Call - Call within 2 business days of discharge: Yes    Attempted to reach patient for transitions of care follow up. Unable to reach patient.    Outreach Attempts:   2ND CTC attempt to reach Pt regarding recent hospital discharge.  CTC left voice recording with call back number requesting a call back. CTN will close out CTN episode at this time.    Patient: Blanche Hensley    Patient : 1990   MRN: [id number]     Reason for Admission: BILATERAL LUMBAR 4-LUMBAR 5 DECOMPRESSION AND DISCECTOMY Bilateral   Discharge Date: 3/12/25    RURS: Readmission Risk Score: 4.8    Last Discharge Facility       Date Complaint Diagnosis Description Type Department Provider    3/11/25  S/P lumbar fusion Admission (Discharged) [facility] Jami Ramírez MD            Was this an external facility discharge? No    Follow Up Appointment:   Patient does not have a follow up appointment scheduled at time of call.  Unable to reach patient, will forward a message to PCP pool      No further follow-up call indicated     Thank You,    Hiral León RN  Care Transition Coordinator  Contact Number:[phone]

## 2025-03-27 PROBLEM — Z01.818 PREOP EXAM FOR INTERNAL MEDICINE: Status: RESOLVED | Noted: 2025-01-29 | Resolved: 2025-03-27

## 2025-04-04 RX ORDER — GABAPENTIN 300 MG/1
300 CAPSULE ORAL 4 TIMES DAILY
Qty: 120 CAPSULE | Refills: 1 | OUTPATIENT
Start: 2025-04-04 | End: 2025-06-03

## 2025-04-11 DIAGNOSIS — I10 PRIMARY HYPERTENSION: ICD-10-CM

## 2025-04-11 RX ORDER — AMLODIPINE BESYLATE 10 MG/1
10 TABLET ORAL DAILY
Qty: 90 TABLET | Refills: 0 | Status: SHIPPED | OUTPATIENT
Start: 2025-04-11

## 2025-04-11 NOTE — TELEPHONE ENCOUNTER
Last OV: 2/25/2025  Next OV: Visit date not found    Next appointment due: ??    Last fill: 10/8/2024  Refills: 1

## 2025-05-14 ENCOUNTER — OFFICE VISIT (OUTPATIENT)
Dept: INTERNAL MEDICINE CLINIC | Age: 35
End: 2025-05-14

## 2025-05-14 VITALS
BODY MASS INDEX: 37.53 KG/M2 | DIASTOLIC BLOOD PRESSURE: 80 MMHG | WEIGHT: 239.6 LBS | OXYGEN SATURATION: 99 % | SYSTOLIC BLOOD PRESSURE: 140 MMHG | HEART RATE: 80 BPM

## 2025-05-14 DIAGNOSIS — I10 PRIMARY HYPERTENSION: ICD-10-CM

## 2025-05-14 DIAGNOSIS — F41.1 GAD (GENERALIZED ANXIETY DISORDER): Primary | ICD-10-CM

## 2025-05-14 DIAGNOSIS — M51.16 LUMBAR DISC HERNIATION WITH RADICULOPATHY: ICD-10-CM

## 2025-05-14 DIAGNOSIS — F40.9 PHOBIC ANXIETY DISORDER: ICD-10-CM

## 2025-05-14 PROBLEM — M54.16 LUMBAR RADICULOPATHY: Status: RESOLVED | Noted: 2025-03-11 | Resolved: 2025-05-14

## 2025-05-14 RX ORDER — TIZANIDINE 2 MG/1
2 TABLET ORAL EVERY 6 HOURS PRN
COMMUNITY

## 2025-05-14 RX ORDER — FLUOXETINE 10 MG/1
10 CAPSULE ORAL DAILY
Qty: 30 CAPSULE | Refills: 3 | Status: SHIPPED | OUTPATIENT
Start: 2025-05-14

## 2025-05-14 ASSESSMENT — ENCOUNTER SYMPTOMS
CHEST TIGHTNESS: 0
WHEEZING: 0
VOMITING: 0
SORE THROAT: 0
SHORTNESS OF BREATH: 0
COUGH: 0
BACK PAIN: 1
CONSTIPATION: 0
ABDOMINAL PAIN: 0
COLOR CHANGE: 0
NAUSEA: 0

## 2025-05-14 NOTE — ASSESSMENT & PLAN NOTE
Blood pressure checked twice remained borderline elevated, anxiety may be contributing advised will continue current dose of amlodipine and will reevaluate again at her 4 weeks follow-up, reinforced recommendations for salt restriction, weight reduction, healthy diet and active lifestyle as other means to help control blood pressure, she continues to be abstinence from smoking, she did stop alcohol consumption prior to her surgery but started drinking last month although now reports only twice a week and keeping it to a minimum, encouraged to continue that and preferably complete abstinence

## 2025-05-14 NOTE — ASSESSMENT & PLAN NOTE
Symptoms discussed with patient, counseling done, unable to identify specific trigger, she does have strong family history of mental health disorders which is unlikely related to her current symptoms of fear of driving, she does have history of motion sickness which can be contributing to her symptoms, advised at this point recommend CBT and counseling but will go ahead and do a trial of low-dose SSRI therapy, instructions on use and side effects discussed with patient, will reevaluate in 4 weeks or sooner if needed.  Can consider adding beta-blocker therapy by next visit or trial of BuSpar if still with significant symptoms.

## 2025-05-14 NOTE — ASSESSMENT & PLAN NOTE
She is still in therapy, reports symptoms improved significantly, she will continue care and recommendation as per spinal surgery and reports she is expected to return to work in the near future

## 2025-05-14 NOTE — PROGRESS NOTES
ASSESSMENT/PLAN:  1. CHICHO (generalized anxiety disorder)  Assessment & Plan:  Symptoms discussed with patient, counseling done, unable to identify specific trigger, she does have strong family history of mental health disorders which is unlikely related to her current symptoms of fear of driving, she does have history of motion sickness which can be contributing to her symptoms, advised at this point recommend CBT and counseling but will go ahead and do a trial of low-dose SSRI therapy, instructions on use and side effects discussed with patient, will reevaluate in 4 weeks or sooner if needed.  Can consider adding beta-blocker therapy by next visit or trial of BuSpar if still with significant symptoms.   Orders:  -     FLUoxetine (PROZAC) 10 MG capsule; Take 1 capsule by mouth daily, Disp-30 capsule, R-3Normal  -     Ambulatory referral to Psychology  2. Phobic anxiety disorder  Assessment & Plan:  As noted above   Orders:  -     FLUoxetine (PROZAC) 10 MG capsule; Take 1 capsule by mouth daily, Disp-30 capsule, R-3Normal  -     Ambulatory referral to Psychology  3. Primary hypertension  Assessment & Plan:  Blood pressure checked twice remained borderline elevated, anxiety may be contributing advised will continue current dose of amlodipine and will reevaluate again at her 4 weeks follow-up, reinforced recommendations for salt restriction, weight reduction, healthy diet and active lifestyle as other means to help control blood pressure, she continues to be abstinence from smoking, she did stop alcohol consumption prior to her surgery but started drinking last month although now reports only twice a week and keeping it to a minimum, encouraged to continue that and preferably complete abstinence   4. Lumbar disc herniation with radiculopathy  Assessment & Plan:  She is still in therapy, reports symptoms improved significantly, she will continue care and recommendation as per spinal surgery and reports she is expected to

## 2025-05-19 ENCOUNTER — OFFICE VISIT (OUTPATIENT)
Dept: PSYCHOLOGY | Age: 35
End: 2025-05-19
Payer: COMMERCIAL

## 2025-05-19 DIAGNOSIS — F40.248 SITUATIONAL PHOBIA: Primary | ICD-10-CM

## 2025-05-19 PROCEDURE — 90791 PSYCH DIAGNOSTIC EVALUATION: CPT | Performed by: PSYCHOLOGIST

## 2025-05-19 ASSESSMENT — PATIENT HEALTH QUESTIONNAIRE - PHQ9
4. FEELING TIRED OR HAVING LITTLE ENERGY: SEVERAL DAYS
5. POOR APPETITE OR OVEREATING: NOT AT ALL
SUM OF ALL RESPONSES TO PHQ QUESTIONS 1-9: 5
SUM OF ALL RESPONSES TO PHQ QUESTIONS 1-9: 5
2. FEELING DOWN, DEPRESSED OR HOPELESS: NOT AT ALL
3. TROUBLE FALLING OR STAYING ASLEEP: MORE THAN HALF THE DAYS
1. LITTLE INTEREST OR PLEASURE IN DOING THINGS: SEVERAL DAYS
10. IF YOU CHECKED OFF ANY PROBLEMS, HOW DIFFICULT HAVE THESE PROBLEMS MADE IT FOR YOU TO DO YOUR WORK, TAKE CARE OF THINGS AT HOME, OR GET ALONG WITH OTHER PEOPLE: SOMEWHAT DIFFICULT
6. FEELING BAD ABOUT YOURSELF - OR THAT YOU ARE A FAILURE OR HAVE LET YOURSELF OR YOUR FAMILY DOWN: SEVERAL DAYS
8. MOVING OR SPEAKING SO SLOWLY THAT OTHER PEOPLE COULD HAVE NOTICED. OR THE OPPOSITE, BEING SO FIGETY OR RESTLESS THAT YOU HAVE BEEN MOVING AROUND A LOT MORE THAN USUAL: NOT AT ALL
SUM OF ALL RESPONSES TO PHQ QUESTIONS 1-9: 5
9. THOUGHTS THAT YOU WOULD BE BETTER OFF DEAD, OR OF HURTING YOURSELF: NOT AT ALL
7. TROUBLE CONCENTRATING ON THINGS, SUCH AS READING THE NEWSPAPER OR WATCHING TELEVISION: NOT AT ALL
SUM OF ALL RESPONSES TO PHQ QUESTIONS 1-9: 5

## 2025-05-19 ASSESSMENT — ANXIETY QUESTIONNAIRES
6. BECOMING EASILY ANNOYED OR IRRITABLE: SEVERAL DAYS
5. BEING SO RESTLESS THAT IT IS HARD TO SIT STILL: NOT AT ALL
3. WORRYING TOO MUCH ABOUT DIFFERENT THINGS: MORE THAN HALF THE DAYS
1. FEELING NERVOUS, ANXIOUS, OR ON EDGE: MORE THAN HALF THE DAYS
4. TROUBLE RELAXING: SEVERAL DAYS
7. FEELING AFRAID AS IF SOMETHING AWFUL MIGHT HAPPEN: MORE THAN HALF THE DAYS
2. NOT BEING ABLE TO STOP OR CONTROL WORRYING: NEARLY EVERY DAY
IF YOU CHECKED OFF ANY PROBLEMS ON THIS QUESTIONNAIRE, HOW DIFFICULT HAVE THESE PROBLEMS MADE IT FOR YOU TO DO YOUR WORK, TAKE CARE OF THINGS AT HOME, OR GET ALONG WITH OTHER PEOPLE: SOMEWHAT DIFFICULT
GAD7 TOTAL SCORE: 11

## 2025-05-19 NOTE — PROGRESS NOTES
Behavioral Health Consultation  Amna Zapata M.A.  Psychology Practicum Student  Supervised by Rama Kinney, Ph.D.  05/19/25  11:21 AM EDT      Time spent with Patient: 40 minutes  This is patient's first [facility] appointment.    Reason for Consult:    Chief Complaint   Patient presents with    Anxiety    New Patient     Referring Provider: Farzaneh Blackburn MD    Pt provided informed consent for the behavioral health program. Discussed with patient model of service to include the limits of confidentiality (i.e. abuse reporting, suicide intervention, etc.) and short-term intervention focused approach. Reviewed nature of supervision and pt signed consent. Pt indicated understanding.  Feedback given to PCP.    S:  Patient presents with concerns about anxiety while driving. Pt experiences a sensation where she feel like she is not in control while driving and does not feel mentally present. This feelings happens the most when she gets to big intersections with bridges or wide highways. Pt worries often in these situations (e.g., she will pass out and veer off the side of the road). Pt denied any traumatic events associated with driving (trauma and stressor related disorder criteria). Pt denied her symptoms as being related to fear of escaping or not being able to get help (agoraphobia criteria). Anxiety sx occurring over half the days or nearly everyday include: feeling nervous, anxious, or on edge, excessive worry, difficulty controlling worry, panic attacks, and feeling afraid as if something might happen. Panic attack sx include: muscle tension, tachycardia, SOB, diaphoresis, tingling in extremities, and GI distress.  Pt recently stopped drinking (after needing to for surgery) and suspects this may be related to her anxiety; she also suspects her history of vertigo may be related to her anxiety. Pt is planning to go back into work soon (after being out due to getting surgery) and is worries that she won't be able

## 2025-05-19 NOTE — PATIENT INSTRUCTIONS
healthy or I must have a fatal disease.”  Jumping to conclusions. You make a negative interpretation even though there are no definite facts that convincingly support your conclusion. “My  is late because he is in a car accident and is injured on the side of the road.”  Fortune-telling. You anticipate things will turn out badly, convinced the prediction is a fact. “Not getting this job will cause us to lose the house.”   Should statements. “Musts” and “oughts” are also offenders. Emotional consequences can include anxiety and anger. “I should be able to handle this.”   Overgeneralization. Assuming one event is actually a pattern. “My hand is a little shaky today, I must have Parkinson’s Disease.”  Disqualifying the positive. Filtering out or rejecting positive experiences to maintain negative beliefs. Upon hearing that your spouse has checked all the doors and windows and they are all locked you think, “But someone could cut out a piece of glass and open the window.”  Catastrophizing. Predicting the worst possible outcome imaginable. “Terrible,” “awful,” “horrible,” “worst ever” might be key words. “If I can’t get my heart to stop pounding I’m going to die.”     Superstitious thinking. The thought that something you do prevents something awful from happening. “Giving my spouse a hug and telling her to be careful before going to work will prevent her from getting in a wreck. I do it every morning and she hasn’t gotten in a wreck yet.”  Emotional reasoning. The belief that because you feel a certain way means that the assumptions and associations you have with that feeling are true. “The fear, doom, and constant anxiety must mean something is seriously wrong with me.”    Grounding  Grounding is a technique that helps keep you focused on the present moment by reorienting you to reality in the here-and-now. Grounding skills can be helpful in managing overwhelming feelings or intense anxiety. They help you to

## 2025-06-30 RX ORDER — GABAPENTIN 300 MG/1
300 CAPSULE ORAL 4 TIMES DAILY
Qty: 120 CAPSULE | Refills: 1 | OUTPATIENT
Start: 2025-06-30 | End: 2025-08-29

## 2025-07-22 DIAGNOSIS — I10 PRIMARY HYPERTENSION: ICD-10-CM

## 2025-07-22 RX ORDER — AMLODIPINE BESYLATE 10 MG/1
10 TABLET ORAL DAILY
Qty: 90 TABLET | Refills: 0 | Status: SHIPPED | OUTPATIENT
Start: 2025-07-22

## 2025-07-22 NOTE — TELEPHONE ENCOUNTER
Medication: Amlodipine    Last Filled: 4/11/25    Pharmacy: [facility]    Last appt: 5/14/2025   Next appt: 7/28/2025    Last OARRS:       3/12/2025     1:17 PM   RX Monitoring   Acute Pain Prescriptions Severe pain not adequately treated with lower dose.

## 2025-07-28 ENCOUNTER — OFFICE VISIT (OUTPATIENT)
Dept: INTERNAL MEDICINE CLINIC | Age: 35
End: 2025-07-28
Payer: COMMERCIAL

## 2025-07-28 VITALS
HEART RATE: 79 BPM | SYSTOLIC BLOOD PRESSURE: 140 MMHG | OXYGEN SATURATION: 100 % | DIASTOLIC BLOOD PRESSURE: 100 MMHG | BODY MASS INDEX: 38.87 KG/M2 | WEIGHT: 248.2 LBS

## 2025-07-28 DIAGNOSIS — G89.29 CHRONIC BILATERAL LOW BACK PAIN WITHOUT SCIATICA: Primary | ICD-10-CM

## 2025-07-28 DIAGNOSIS — M51.16 LUMBAR DISC HERNIATION WITH RADICULOPATHY: ICD-10-CM

## 2025-07-28 DIAGNOSIS — I10 PRIMARY HYPERTENSION: ICD-10-CM

## 2025-07-28 DIAGNOSIS — F41.1 GAD (GENERALIZED ANXIETY DISORDER): ICD-10-CM

## 2025-07-28 DIAGNOSIS — M54.50 CHRONIC BILATERAL LOW BACK PAIN WITHOUT SCIATICA: Primary | ICD-10-CM

## 2025-07-28 PROBLEM — Z78.9 ALCOHOL CONSUMPTION HEAVY: Status: RESOLVED | Noted: 2024-07-09 | Resolved: 2025-07-28

## 2025-07-28 PROCEDURE — 99214 OFFICE O/P EST MOD 30 MIN: CPT | Performed by: INTERNAL MEDICINE

## 2025-07-28 PROCEDURE — 3080F DIAST BP >= 90 MM HG: CPT | Performed by: INTERNAL MEDICINE

## 2025-07-28 PROCEDURE — 3077F SYST BP >= 140 MM HG: CPT | Performed by: INTERNAL MEDICINE

## 2025-07-28 ASSESSMENT — ENCOUNTER SYMPTOMS
NAUSEA: 0
SHORTNESS OF BREATH: 0
ABDOMINAL PAIN: 0
VOMITING: 0
WHEEZING: 0
COLOR CHANGE: 0
CONSTIPATION: 0
CHEST TIGHTNESS: 0
COUGH: 0
BACK PAIN: 1
SORE THROAT: 0

## 2025-07-28 NOTE — ASSESSMENT & PLAN NOTE
Patient s/p lumbar fusion, symptoms of radiculopathy improved significantly but continues to have chronic low back pain and upper body pain, she is scheduled to return back to work in the near future, has been following up with spinal surgery but believe she will benefit from chronic pain management, she did complete physical therapy.  Tylenol and NSAIDs not given satisfactory relief, referral placed at her request and she will continue follow-up with spinal surgery for epidural injections as needed

## 2025-07-28 NOTE — ASSESSMENT & PLAN NOTE
S/p lumbar fusion, recently received epidural block, currently on gabapentin and as needed tizanidine, reports improvement in neuropathy symptoms and no longer having radiculopathy of the legs but continues to have daily back pain despite completing physical therapy.  Will place referral to pain management as noted above and she will continue other care and recommendation as per spinal surgery

## 2025-07-28 NOTE — ASSESSMENT & PLAN NOTE
Never started Prozac, reports grandmother talked her out of taking it, still with active symptoms but feels they are manageable and she is able to suppress them for now, hopeful things will improve once she is back at work, no red flag symptoms, encouraged to call the office if she changed her mind about starting medications otherwise we will reevaluate at her 6 months follow-up appointment

## 2025-07-28 NOTE — ASSESSMENT & PLAN NOTE
Blood pressure checked twice remained elevated, patient has not had amlodipine in over a week due to running out and lapse in insurance.  Encouraged to restart medicine since now able to pick it up at the pharmacy, reinforced recommendations for salt restriction, weight reduction, healthy diet and active lifestyle as other means to help control blood pressure, continue abstinence from smoking and continue keeping alcohol consumption to a minimum.

## 2025-07-28 NOTE — PROGRESS NOTES
ASSESSMENT/PLAN:  1. Chronic bilateral low back pain without sciatica  Assessment & Plan:  Patient s/p lumbar fusion, symptoms of radiculopathy improved significantly but continues to have chronic low back pain and upper body pain, she is scheduled to return back to work in the near future, has been following up with spinal surgery but believe she will benefit from chronic pain management, she did complete physical therapy.  Tylenol and NSAIDs not given satisfactory relief, referral placed at her request and she will continue follow-up with spinal surgery for epidural injections as needed   Orders:  -     Padmaja Newton NP, Pain Management, [place]  2. Lumbar disc herniation with radiculopathy  Assessment & Plan:  S/p lumbar fusion, recently received epidural block, currently on gabapentin and as needed tizanidine, reports improvement in neuropathy symptoms and no longer having radiculopathy of the legs but continues to have daily back pain despite completing physical therapy.  Will place referral to pain management as noted above and she will continue other care and recommendation as per spinal surgery   Orders:  -     Padmaja Newton NP, Pain Management, [place]  3. Primary hypertension  Assessment & Plan:  Blood pressure checked twice remained elevated, patient has not had amlodipine in over a week due to running out and lapse in insurance.  Encouraged to restart medicine since now able to pick it up at the pharmacy, reinforced recommendations for salt restriction, weight reduction, healthy diet and active lifestyle as other means to help control blood pressure, continue abstinence from smoking and continue keeping alcohol consumption to a minimum.   4. CHICHO (generalized anxiety disorder)  Assessment & Plan:  Never started Prozac, reports grandmother talked her out of taking it, still with active symptoms but feels they are manageable and she is able to suppress them for now, hopeful

## 2025-07-30 ENCOUNTER — TELEPHONE (OUTPATIENT)
Dept: INTERNAL MEDICINE CLINIC | Age: 35
End: 2025-07-30

## 2025-07-30 NOTE — TELEPHONE ENCOUNTER
Called patient to schedule with psychology student Landon. Patient states she returns to work next week and is unsure of her schedule. Patient will call back to schedule with student once she knows her schedule.

## (undated) DEVICE — SUTURE VICRYL + SZ 3-0 L18IN CT 1 CR ABSRB VCP838D

## (undated) DEVICE — SPONGE GZ W4XL8IN COT WVN 12 PLY

## (undated) DEVICE — TOOL MR8-14MH30 MR8 14CM MATCH 3MM: Brand: MIDAS REX MR8

## (undated) DEVICE — SPONGE,NEURO,0.5"X0.5",XR,STRL,10/PK: Brand: MEDLINE

## (undated) DEVICE — NEPTUNE E-SEP SMOKE EVACUATION PENCIL, COATED, 70MM BLADE, PUSH BUTTON SWITCH: Brand: NEPTUNE E-SEP

## (undated) DEVICE — GLOVE SURG SZ 75 CRM LTX FREE POLYISOPRENE POLYMER BEAD ANTI

## (undated) DEVICE — COUNTER NDL 40 COUNT HLD 70 NUM FOAM BLK SGL MAG W BLDE REMV

## (undated) DEVICE — GLOVE SURG SZ 65 L12IN FNGR THK79MIL GRN LTX FREE

## (undated) DEVICE — BLANKET WRM W29.9XL79.1IN UP BODY FORC AIR MISTRAL-AIR

## (undated) DEVICE — EXTENSION SET 20 IN 3 CC PINCH CLMP 2 PC M LL STRL

## (undated) DEVICE — SUTURE MONOCRYL + SZ 4-0 L27IN ABSRB UD L19MM PS-2 3/8 CIR MCP426H

## (undated) DEVICE — DRAPE MICSCP W54XL150IN W/ 4 BINOC GLS LENS LEICA

## (undated) DEVICE — SUTURE VICRYL + SZ 2-0 L18IN ABSRB UD CT1 L36MM 1/2 CIR VCP839D

## (undated) DEVICE — NERVE BLOCK TRAY: Brand: MEDLINE INDUSTRIES, INC.

## (undated) DEVICE — GLOVE SURG SZ 65 L12IN FNGR THK94MIL STD WHT LTX FREE

## (undated) DEVICE — KIT POS PT CARE KT W/ GENTLE TCH WILSON +

## (undated) DEVICE — 4-PORT MANIFOLD: Brand: NEPTUNE 2

## (undated) DEVICE — NEEDLE QUINCKE 22GX5": Brand: MEDLINE

## (undated) DEVICE — KIT EVAC 400CC DIA1/8IN H PAT 12.5IN 3 SPR RND SHP PVC DRN

## (undated) DEVICE — TOWEL,STOP FLAG GOLD N-W: Brand: MEDLINE

## (undated) DEVICE — SUTURE VICRYL + SZ 0 L18IN ABSRB UD L36MM CT-1 1/2 CIR VCP840D

## (undated) DEVICE — PAD,NON-ADHERENT,3X8,STERILE,LF,1/PK: Brand: MEDLINE

## (undated) DEVICE — AGENT HEMOSTATIC SURGIFLOW MATRIX KIT W/THROMBIN

## (undated) DEVICE — COVER LT HNDL CAM BLU DISP W/ SURG CTRL

## (undated) DEVICE — LAMINECTOMY: Brand: MEDLINE INDUSTRIES, INC.

## (undated) DEVICE — FORCEP,MOSQUITO,STRAIGHT,5: Brand: MEDLINE

## (undated) DEVICE — SOLUTION IV 1000ML 0.9% SOD CHL

## (undated) DEVICE — PROTECTOR ULN NRV PUR FOAM HK LOOP STRP ANATOMICALLY

## (undated) DEVICE — TRAP FLUID

## (undated) DEVICE — LIQUIBAND RAPID ADHESIVE 36/CS 0.8ML: Brand: MEDLINE

## (undated) DEVICE — AGENT HEMSTAT 1GM CLLGN MICFIB ACT ABSRB FLOUR AVIT